# Patient Record
Sex: MALE | Race: BLACK OR AFRICAN AMERICAN | Employment: OTHER | URBAN - NONMETROPOLITAN AREA
[De-identification: names, ages, dates, MRNs, and addresses within clinical notes are randomized per-mention and may not be internally consistent; named-entity substitution may affect disease eponyms.]

---

## 2018-12-01 ENCOUNTER — APPOINTMENT (OUTPATIENT)
Dept: GENERAL RADIOLOGY | Age: 48
End: 2018-12-01
Payer: MEDICAID

## 2018-12-01 ENCOUNTER — HOSPITAL ENCOUNTER (EMERGENCY)
Age: 48
Discharge: HOME | End: 2018-12-01
Attending: EMERGENCY MEDICINE
Payer: MEDICAID

## 2018-12-01 VITALS
SYSTOLIC BLOOD PRESSURE: 141 MMHG | RESPIRATION RATE: 16 BRPM | HEIGHT: 75 IN | DIASTOLIC BLOOD PRESSURE: 84 MMHG | BODY MASS INDEX: 39.17 KG/M2 | OXYGEN SATURATION: 95 % | HEART RATE: 72 BPM | WEIGHT: 315 LBS | TEMPERATURE: 97.6 F

## 2018-12-01 DIAGNOSIS — R07.9 ACUTE CHEST PAIN: Primary | ICD-10-CM

## 2018-12-01 LAB
ALBUMIN SERPL-MCNC: 3.9 G/DL (ref 3.5–5.2)
ALP BLD-CCNC: 51 U/L (ref 40–130)
ALT SERPL-CCNC: 41 U/L (ref 5–41)
ANION GAP SERPL CALCULATED.3IONS-SCNC: 14 MMOL/L (ref 7–19)
APTT: 26.4 SEC (ref 26–36.2)
AST SERPL-CCNC: 30 U/L (ref 5–40)
BASOPHILS ABSOLUTE: 0 K/UL (ref 0–0.2)
BASOPHILS RELATIVE PERCENT: 0.5 % (ref 0–1)
BILIRUB SERPL-MCNC: 0.6 MG/DL (ref 0.2–1.2)
BUN BLDV-MCNC: 12 MG/DL (ref 6–20)
CALCIUM SERPL-MCNC: 8.4 MG/DL (ref 8.6–10)
CHLORIDE BLD-SCNC: 101 MMOL/L (ref 98–111)
CO2: 22 MMOL/L (ref 22–29)
CREAT SERPL-MCNC: 1 MG/DL (ref 0.5–1.2)
EOSINOPHILS ABSOLUTE: 0.1 K/UL (ref 0–0.6)
EOSINOPHILS RELATIVE PERCENT: 3.4 % (ref 0–5)
GFR NON-AFRICAN AMERICAN: >60
GLUCOSE BLD-MCNC: 104 MG/DL (ref 74–109)
HCT VFR BLD CALC: 39.2 % (ref 42–52)
HEMOGLOBIN: 12.2 G/DL (ref 14–18)
INR BLD: 1 (ref 0.88–1.18)
LIPASE: 20 U/L (ref 13–60)
LYMPHOCYTES ABSOLUTE: 0.7 K/UL (ref 1.1–4.5)
LYMPHOCYTES RELATIVE PERCENT: 17.8 % (ref 20–40)
MCH RBC QN AUTO: 27.8 PG (ref 27–31)
MCHC RBC AUTO-ENTMCNC: 31.1 G/DL (ref 33–37)
MCV RBC AUTO: 89.3 FL (ref 80–94)
MONOCYTES ABSOLUTE: 0.5 K/UL (ref 0–0.9)
MONOCYTES RELATIVE PERCENT: 11.1 % (ref 0–10)
NEUTROPHILS ABSOLUTE: 2.7 K/UL (ref 1.5–7.5)
NEUTROPHILS RELATIVE PERCENT: 65.3 % (ref 50–65)
PDW BLD-RTO: 12.8 % (ref 11.5–14.5)
PERFORMED ON: NORMAL
PLATELET # BLD: 187 K/UL (ref 130–400)
PMV BLD AUTO: 11.1 FL (ref 9.4–12.4)
POC TROPONIN I: 0.04 NG/ML (ref 0–0.08)
POTASSIUM SERPL-SCNC: 3.4 MMOL/L (ref 3.5–5)
PROTHROMBIN TIME: 13.1 SEC (ref 12–14.6)
RBC # BLD: 4.39 M/UL (ref 4.7–6.1)
SODIUM BLD-SCNC: 137 MMOL/L (ref 136–145)
TOTAL PROTEIN: 7.3 G/DL (ref 6.6–8.7)
TROPONIN: <0.01 NG/ML (ref 0–0.03)
WBC # BLD: 4.2 K/UL (ref 4.8–10.8)

## 2018-12-01 PROCEDURE — 6370000000 HC RX 637 (ALT 250 FOR IP): Performed by: EMERGENCY MEDICINE

## 2018-12-01 PROCEDURE — 93005 ELECTROCARDIOGRAM TRACING: CPT

## 2018-12-01 PROCEDURE — 85730 THROMBOPLASTIN TIME PARTIAL: CPT

## 2018-12-01 PROCEDURE — 84484 ASSAY OF TROPONIN QUANT: CPT

## 2018-12-01 PROCEDURE — 96375 TX/PRO/DX INJ NEW DRUG ADDON: CPT

## 2018-12-01 PROCEDURE — 99285 EMERGENCY DEPT VISIT HI MDM: CPT | Performed by: EMERGENCY MEDICINE

## 2018-12-01 PROCEDURE — 2580000003 HC RX 258: Performed by: EMERGENCY MEDICINE

## 2018-12-01 PROCEDURE — 99285 EMERGENCY DEPT VISIT HI MDM: CPT

## 2018-12-01 PROCEDURE — 85025 COMPLETE CBC W/AUTO DIFF WBC: CPT

## 2018-12-01 PROCEDURE — 71045 X-RAY EXAM CHEST 1 VIEW: CPT

## 2018-12-01 PROCEDURE — 85610 PROTHROMBIN TIME: CPT

## 2018-12-01 PROCEDURE — 96374 THER/PROPH/DIAG INJ IV PUSH: CPT

## 2018-12-01 PROCEDURE — 36415 COLL VENOUS BLD VENIPUNCTURE: CPT

## 2018-12-01 PROCEDURE — 83690 ASSAY OF LIPASE: CPT

## 2018-12-01 PROCEDURE — 6360000002 HC RX W HCPCS: Performed by: EMERGENCY MEDICINE

## 2018-12-01 PROCEDURE — 80053 COMPREHEN METABOLIC PANEL: CPT

## 2018-12-01 RX ORDER — PANTOPRAZOLE SODIUM 40 MG/1
40 TABLET, DELAYED RELEASE ORAL DAILY
Qty: 30 TABLET | Refills: 1 | Status: SHIPPED | OUTPATIENT
Start: 2018-12-01

## 2018-12-01 RX ORDER — MELOXICAM 15 MG/1
15 TABLET ORAL DAILY
COMMUNITY

## 2018-12-01 RX ORDER — ONDANSETRON 2 MG/ML
4 INJECTION INTRAMUSCULAR; INTRAVENOUS ONCE
Status: COMPLETED | OUTPATIENT
Start: 2018-12-01 | End: 2018-12-01

## 2018-12-01 RX ORDER — MORPHINE SULFATE 2 MG/ML
4 INJECTION, SOLUTION INTRAMUSCULAR; INTRAVENOUS ONCE
Status: COMPLETED | OUTPATIENT
Start: 2018-12-01 | End: 2018-12-01

## 2018-12-01 RX ORDER — 0.9 % SODIUM CHLORIDE 0.9 %
1000 INTRAVENOUS SOLUTION INTRAVENOUS ONCE
Status: COMPLETED | OUTPATIENT
Start: 2018-12-01 | End: 2018-12-01

## 2018-12-01 RX ADMIN — ONDANSETRON 4 MG: 2 INJECTION INTRAMUSCULAR; INTRAVENOUS at 13:50

## 2018-12-01 RX ADMIN — SODIUM CHLORIDE 1000 ML: 9 INJECTION, SOLUTION INTRAVENOUS at 13:54

## 2018-12-01 RX ADMIN — MORPHINE SULFATE 4 MG: 2 INJECTION, SOLUTION INTRAMUSCULAR; INTRAVENOUS at 13:52

## 2018-12-01 RX ADMIN — LIDOCAINE HYDROCHLORIDE: 20 SOLUTION ORAL; TOPICAL at 13:54

## 2018-12-01 ASSESSMENT — PAIN SCALES - GENERAL
PAINLEVEL_OUTOF10: 6
PAINLEVEL_OUTOF10: 8
PAINLEVEL_OUTOF10: 0

## 2018-12-01 ASSESSMENT — PAIN DESCRIPTION - LOCATION: LOCATION: CHEST

## 2018-12-01 ASSESSMENT — ENCOUNTER SYMPTOMS
RHINORRHEA: 0
SORE THROAT: 0
ABDOMINAL PAIN: 1
DIARRHEA: 1
VOMITING: 1
SHORTNESS OF BREATH: 0
BACK PAIN: 0
COUGH: 0
NAUSEA: 1

## 2018-12-01 ASSESSMENT — PAIN DESCRIPTION - PAIN TYPE: TYPE: ACUTE PAIN

## 2018-12-01 ASSESSMENT — PAIN DESCRIPTION - FREQUENCY: FREQUENCY: INTERMITTENT

## 2018-12-01 NOTE — ED PROVIDER NOTES
[facility] EMERGENCY DEPT  eMERGENCY dEPARTMENT eNCOUnter      Pt Name: Everett Jiang  MRN: [id number]  [place] 1970  Date of evaluation: 12/1/2018  Provider: Melissa Starr MD    [address] [place] [address]       Chief Complaint   Patient presents with    Chest Pain     started yesterday, radiating to back, no cardiac hx, pt also complains of n/v/d         HISTORY OF PRESENT ILLNESS   (Location/Symptom, Timing/Onset,Context/Setting, Quality, Duration, Modifying Factors, Severity)  Note limiting factors. Everett Jiang is a 50 y.o. male who presents to the emergency department For concern of chest discomfort. Patient states since yesterday he has had vague lower chest discomfort that is worse when he lays down. He states prior to arrival was seen like it is radiating to his back somewhat. He has had 1 episode of vomiting yesterday and several episodes of watery diarrhea. His girlfriend take his temperature yesterday and told it was over 100 but he is not sure of the exact measurement. He denies any reflux type symptoms or burning type sensation. No prior cardiac history does admit to history of hypertension but no other obvious cardiac risk factors. He drinks 4 beers per day but has no prior history of pancreatitis. He has no complaint of shortness of breath or leg swelling. HPI    NursingNotes were reviewed. REVIEW OF SYSTEMS    (2-9 systems for level 4, 10 or more for level 5)     Review of Systems   Constitutional: Positive for fatigue and fever. Negative for chills. HENT: Negative for rhinorrhea and sore throat. Respiratory: Negative for cough and shortness of breath. Cardiovascular: Positive for chest pain. Negative for leg swelling. Gastrointestinal: Positive for abdominal pain, diarrhea, nausea and vomiting. Genitourinary: Negative for dysuria and frequency. Musculoskeletal: Negative for back pain and neck pain. Neurological: Negative for dizziness and headaches.    All other systems reviewed and

## 2018-12-04 LAB
EKG P AXIS: 44 DEGREES
EKG P-R INTERVAL: 164 MS
EKG Q-T INTERVAL: 354 MS
EKG QRS DURATION: 78 MS
EKG QTC CALCULATION (BAZETT): 387 MS
EKG T AXIS: 40 DEGREES

## 2019-02-10 ENCOUNTER — APPOINTMENT (OUTPATIENT)
Dept: GENERAL RADIOLOGY | Age: 49
End: 2019-02-10
Payer: MEDICAID

## 2019-02-10 ENCOUNTER — HOSPITAL ENCOUNTER (EMERGENCY)
Age: 49
Discharge: HOME | End: 2019-02-10
Payer: MEDICAID

## 2019-02-10 VITALS
RESPIRATION RATE: 16 BRPM | SYSTOLIC BLOOD PRESSURE: 139 MMHG | OXYGEN SATURATION: 96 % | HEIGHT: 74 IN | WEIGHT: 315 LBS | DIASTOLIC BLOOD PRESSURE: 77 MMHG | BODY MASS INDEX: 40.43 KG/M2 | HEART RATE: 81 BPM | TEMPERATURE: 97.6 F

## 2019-02-10 DIAGNOSIS — M79.675 GREAT TOE PAIN, LEFT: Primary | ICD-10-CM

## 2019-02-10 PROCEDURE — 73630 X-RAY EXAM OF FOOT: CPT

## 2019-02-10 PROCEDURE — 96372 THER/PROPH/DIAG INJ SC/IM: CPT

## 2019-02-10 PROCEDURE — 99283 EMERGENCY DEPT VISIT LOW MDM: CPT | Performed by: NURSE PRACTITIONER

## 2019-02-10 PROCEDURE — 6360000002 HC RX W HCPCS: Performed by: NURSE PRACTITIONER

## 2019-02-10 PROCEDURE — 99283 EMERGENCY DEPT VISIT LOW MDM: CPT

## 2019-02-10 PROCEDURE — 6370000000 HC RX 637 (ALT 250 FOR IP): Performed by: NURSE PRACTITIONER

## 2019-02-10 RX ORDER — METHYLPREDNISOLONE 4 MG/1
TABLET ORAL
Qty: 1 KIT | Refills: 0 | Status: SHIPPED | OUTPATIENT
Start: 2019-02-10 | End: 2020-03-08

## 2019-02-10 RX ORDER — DEXAMETHASONE SODIUM PHOSPHATE 10 MG/ML
10 INJECTION, SOLUTION INTRAMUSCULAR; INTRAVENOUS ONCE
Status: COMPLETED | OUTPATIENT
Start: 2019-02-10 | End: 2019-02-10

## 2019-02-10 RX ORDER — HYDROCODONE BITARTRATE AND ACETAMINOPHEN 5; 325 MG/1; MG/1
1 TABLET ORAL EVERY 6 HOURS PRN
Qty: 12 TABLET | Refills: 0 | Status: SHIPPED | OUTPATIENT
Start: 2019-02-10 | End: 2019-02-13

## 2019-02-10 RX ORDER — KETOROLAC TROMETHAMINE 30 MG/ML
60 INJECTION, SOLUTION INTRAMUSCULAR; INTRAVENOUS ONCE
Status: COMPLETED | OUTPATIENT
Start: 2019-02-10 | End: 2019-02-10

## 2019-02-10 RX ORDER — OXYCODONE HYDROCHLORIDE AND ACETAMINOPHEN 5; 325 MG/1; MG/1
2 TABLET ORAL ONCE
Status: COMPLETED | OUTPATIENT
Start: 2019-02-10 | End: 2019-02-10

## 2019-02-10 RX ORDER — NAPROXEN 500 MG/1
500 TABLET ORAL 2 TIMES DAILY
Qty: 20 TABLET | Refills: 0 | Status: SHIPPED | OUTPATIENT
Start: 2019-02-10 | End: 2020-06-17

## 2019-02-10 RX ADMIN — DEXAMETHASONE SODIUM PHOSPHATE 10 MG: 10 INJECTION, SOLUTION INTRAMUSCULAR; INTRAVENOUS at 02:41

## 2019-02-10 RX ADMIN — KETOROLAC TROMETHAMINE 60 MG: 30 INJECTION, SOLUTION INTRAMUSCULAR at 02:40

## 2019-02-10 RX ADMIN — OXYCODONE AND ACETAMINOPHEN 2 TABLET: 5; 325 TABLET ORAL at 02:39

## 2019-02-10 ASSESSMENT — PAIN DESCRIPTION - LOCATION: LOCATION: FOOT

## 2019-02-10 ASSESSMENT — PAIN DESCRIPTION - PAIN TYPE: TYPE: ACUTE PAIN

## 2019-02-10 ASSESSMENT — PAIN DESCRIPTION - ORIENTATION: ORIENTATION: LEFT

## 2019-02-10 ASSESSMENT — PAIN SCALES - GENERAL
PAINLEVEL_OUTOF10: 10
PAINLEVEL_OUTOF10: 0

## 2019-04-19 ENCOUNTER — APPOINTMENT (OUTPATIENT)
Dept: CT IMAGING | Age: 49
End: 2019-04-19
Payer: OTHER MISCELLANEOUS

## 2019-04-19 ENCOUNTER — HOSPITAL ENCOUNTER (EMERGENCY)
Age: 49
Discharge: HOME | End: 2019-04-19
Payer: OTHER MISCELLANEOUS

## 2019-04-19 VITALS
SYSTOLIC BLOOD PRESSURE: 137 MMHG | WEIGHT: 315 LBS | OXYGEN SATURATION: 97 % | HEART RATE: 74 BPM | DIASTOLIC BLOOD PRESSURE: 83 MMHG | BODY MASS INDEX: 42.37 KG/M2 | TEMPERATURE: 98.9 F | RESPIRATION RATE: 14 BRPM
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
## 2019-04-19 PROCEDURE — 96372 THER/PROPH/DIAG INJ SC/IM: CPT

## 2019-04-19 PROCEDURE — 99284 EMERGENCY DEPT VISIT MOD MDM: CPT | Performed by: PHYSICIAN ASSISTANT

## 2019-04-19 PROCEDURE — 6360000002 HC RX W HCPCS: Performed by: PHYSICIAN ASSISTANT

## 2019-04-19 PROCEDURE — 99284 EMERGENCY DEPT VISIT MOD MDM: CPT

## 2019-04-19 PROCEDURE — 72125 CT NECK SPINE W/O DYE: CPT

## 2019-04-19 RX ORDER — CYCLOBENZAPRINE HCL 10 MG
10 TABLET ORAL 3 TIMES DAILY PRN
Qty: 30 TABLET | Refills: 0 | Status: SHIPPED | OUTPATIENT
Start: 2019-04-19 | End: 2019-04-29

## 2019-04-19 RX ORDER — ORPHENADRINE CITRATE 30 MG/ML
60 INJECTION INTRAMUSCULAR; INTRAVENOUS ONCE
Status: COMPLETED | OUTPATIENT
Start: 2019-04-19 | End: 2019-04-19

## 2019-04-19 RX ADMIN — ORPHENADRINE CITRATE 60 MG: 30 INJECTION INTRAMUSCULAR; INTRAVENOUS at 17:06

## 2019-04-19 ASSESSMENT — PAIN SCALES - GENERAL
PAINLEVEL_OUTOF10: 2
PAINLEVEL_OUTOF10: 8

## 2019-04-19 ASSESSMENT — PAIN DESCRIPTION - PAIN TYPE: TYPE: ACUTE PAIN

## 2019-04-19 ASSESSMENT — PAIN DESCRIPTION - LOCATION
LOCATION: NECK
LOCATION: NECK

## 2019-04-19 ASSESSMENT — ENCOUNTER SYMPTOMS
SHORTNESS OF BREATH: 0
BACK PAIN: 0
EYE ITCHING: 0
PHOTOPHOBIA: 0
EYE DISCHARGE: 0
COLOR CHANGE: 0
COUGH: 0

## 2019-04-19 ASSESSMENT — PAIN - FUNCTIONAL ASSESSMENT: PAIN_FUNCTIONAL_ASSESSMENT: 0-10
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
## 2019-04-22 ASSESSMENT — ENCOUNTER SYMPTOMS
CHOKING: 0
WHEEZING: 0
STRIDOR: 0

## 2019-05-09 ENCOUNTER — TRANSCRIBE ORDERS (OUTPATIENT)
Dept: PHYSICAL THERAPY | Facility: HOSPITAL | Age: 49
End: 2019-05-09

## 2019-05-09 DIAGNOSIS — M54.2 CERVICALGIA: Primary | ICD-10-CM

## 2019-05-17 ENCOUNTER — HOSPITAL ENCOUNTER (OUTPATIENT)
Dept: PHYSICAL THERAPY | Facility: HOSPITAL | Age: 49
Setting detail: THERAPIES SERIES
Discharge: HOME | End: 2019-05-17

## 2019-05-17 DIAGNOSIS — M54.2 CERVICALGIA: ICD-10-CM

## 2019-05-17 DIAGNOSIS — S16.1XXA CERVICAL STRAIN, ACUTE, INITIAL ENCOUNTER: Primary | ICD-10-CM

## 2019-05-17 PROCEDURE — 97161 PT EVAL LOW COMPLEX 20 MIN: CPT | Performed by: PHYSICAL THERAPIST
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
## 2019-05-20 ENCOUNTER — HOSPITAL ENCOUNTER (OUTPATIENT)
Dept: PHYSICAL THERAPY | Facility: HOSPITAL | Age: 49
Setting detail: THERAPIES SERIES
Discharge: HOME | End: 2019-05-20

## 2019-05-20 DIAGNOSIS — M54.2 CERVICALGIA: ICD-10-CM

## 2019-05-20 DIAGNOSIS — S16.1XXA CERVICAL STRAIN, ACUTE, INITIAL ENCOUNTER: Primary | ICD-10-CM

## 2019-05-20 PROCEDURE — 97032 APPL MODALITY 1+ESTIM EA 15: CPT

## 2019-05-20 PROCEDURE — 97140 MANUAL THERAPY 1/> REGIONS: CPT

## 2019-05-20 NOTE — THERAPY TREATMENT NOTE
"    Outpatient Physical Therapy Ortho Treatment Note  [facility]     Patient Name: Lee Deal  : 1970  MRN: [id number]  Today's Date: 2019      Visit Date: 2019    Visit Dx:    ICD-10-CM ICD-9-CM   1. Cervical strain, acute, initial encounter S16.1XXA 847.0   2. Cervicalgia M54.2 723.1       There is no problem list on file for this patient.       Past Medical History:   Diagnosis Date   • Anxiety    • Depression    • Hypertension         Past Surgical History:   Procedure Laterality Date   • JOINT REPLACEMENT Left     TKR (post)                       PT Assessment/Plan     Row Name 19 1201          PT Assessment    Assessment Comments  No goals have been met at this time; however, today is the first treaatmment session following the initial evaluation. He does present with a TP in his L upper trap that is very point tender and limited the pressure I could apply with STM today.   -EC        PT Plan    PT Plan Comments  Continue a conservative progressioon for now. Consider utilizing KT applications to reduce soft tissue restrictions and inhibit his L upper trap.  -EC       User Key  (r) = Recorded By, (t) = Taken By, (c) = Cosigned By    Initials Name Provider Type    Ron Jennings PTA Physical Therapy Assistant          Modalities     Row Name 19 1100             Subjective Pain    Post-Treatment Pain Level  3  -EC         ELECTRICAL STIMULATION    Attended/Unattended  Attended  -EC      Location/Electrode Placement/Other  B lower cevical region and upper traps  -EC      18148 - PT E-Stim Attended (Manual) Minutes  15  -EC        User Key  (r) = Recorded By, (t) = Taken By, (c) = Cosigned By    Initials Name Provider Type    Ron Jennings PTA Physical Therapy Assistant        Exercises     Row Name 19 1100             Subjective Comments    Subjective Comments  Pt reports pain in his neck taht \"runs into the middle\"  -EC         Subjective Pain    Able to " rate subjective pain?  yes  -EC      Pre-Treatment Pain Level  6  -EC      Post-Treatment Pain Level  3  -EC         Total Minutes    85324 - PT Manual Therapy Minutes  30  -EC        User Key  (r) = Recorded By, (t) = Taken By, (c) = Cosigned By    Initials Name Provider Type    EC Ron Hurt PTA Physical Therapy Assistant                      Manual Rx (last 36 hours)      Manual Treatments     Row Name 05/20/19 1100             Total Minutes    21023 - PT Manual Therapy Minutes  30  -EC         Manual Rx 1    Manual Rx 1 Location  B upper trap, levator scapulae, and lower cervical paraspinals  -EC      Manual Rx 1 Type  STM in sitting with B UE unweighted  -EC      Manual Rx 1 Duration  30  -EC        User Key  (r) = Recorded By, (t) = Taken By, (c) = Cosigned By    Initials Name Provider Type    Ron Jennings PTA Physical Therapy Assistant          PT OP Goals     Row Name 05/20/19 1105 05/20/19 1100       PT Short Term Goals    STG Date to Achieve  --  06/07/19  -EC    STG 1  --  Improve muscle guarding in UT and scalenes  -EC    STG 1 Progress  --  Ongoing  -EC    STG 2  --  Improve mobility of upper thoracic spine  -EC    STG 2 Progress  --  Ongoing  -EC       Long Term Goals    LTG Date to Achieve  --  06/28/19  -EC    LTG 1  --  Improve good cervical rotation to symmetrical without pain  -EC    LTG 1 Progress  --  Ongoing  -EC    LTG 2  --  Improve cervical extension to 75% without pain  -EC    LTG 2 Progress  --  Ongoing  -EC    LTG 3  --  Able to perform household activities without exacerbation of pain  -EC    LTG 3 Progress  --  Ongoing  -EC    LTG 4  --  Independent with HEP for ROM   -EC    LTG 4 Progress  --  Ongoing  -EC    LTG 5  --  Improve NDI to <10%  -EC    LTG 5 Progress  --  Ongoing  -EC       Time Calculation    PT Goal Re-Cert Due Date  06/16/19  -EC  --      User Key  (r) = Recorded By, (t) = Taken By, (c) = Cosigned By    Initials Name Provider Type    Ron Jennings,  PTA Physical Therapy Assistant          Therapy Education  Given: Symptoms/condition management, Pain management, Posture/body mechanics  How Provided: Verbal, Demonstration  Level of Understanding: Verbalized, Demonstrated              Time Calculation:   Start Time: 1105  Stop Time: 1150  Time Calculation (min): 45 min  Total Timed Code Minutes- PT: 45 minute(s)  Therapy Charges for Today     Code Description Service Date Service Provider Modifiers Qty    16155590936 HC PT MANUAL THERAPY EA 15 MIN 5/20/2019 Ron Hurt, PTA GP 2    21448296640 HC PT ELEC STIM EA-PER 15 MIN 5/20/2019 Ron Hurt, PTA GP 1                    Ron Hurt PTA  5/20/2019

## 2019-05-23 ENCOUNTER — HOSPITAL ENCOUNTER (OUTPATIENT)
Dept: PHYSICAL THERAPY | Facility: HOSPITAL | Age: 49
Setting detail: THERAPIES SERIES
Discharge: HOME | End: 2019-05-23

## 2019-05-23 DIAGNOSIS — M54.2 CERVICALGIA: ICD-10-CM

## 2019-05-23 DIAGNOSIS — S16.1XXA CERVICAL STRAIN, ACUTE, INITIAL ENCOUNTER: Primary | ICD-10-CM

## 2019-05-23 PROCEDURE — G0283 ELEC STIM OTHER THAN WOUND: HCPCS

## 2019-05-23 PROCEDURE — 29200 STRAPPING THORAX: CPT

## 2019-05-23 NOTE — THERAPY TREATMENT NOTE
"    Outpatient Physical Therapy Ortho Treatment Note  [facility]     Patient Name: Lee Deal  : 1970  MRN: [id number]  Today's Date: 2019      Visit Date: 2019    Visit Dx:    ICD-10-CM ICD-9-CM   1. Cervical strain, acute, initial encounter S16.1XXA 847.0   2. Cervicalgia M54.2 723.1       There is no problem list on file for this patient.       Past Medical History:   Diagnosis Date   • Anxiety    • Depression    • Hypertension         Past Surgical History:   Procedure Laterality Date   • JOINT REPLACEMENT Left     TKR (post)                       PT Assessment/Plan     Row Name 19 1200          PT Assessment    Assessment Comments  His pain symptoms are improved but we will need to continue a conservative progression. He still has a tendency to \"look for\" his pain symptoms by moving his neck with a combination of rotation and lateral bending despite cues to resist this impulse.  -EC        PT Plan    PT Plan Comments  Follow up his long term response to today's session and progress accordingly. Consider having him perform scapular retractions and monitor very closely to make sure he doesn't activate his B upper traps.  -EC       User Key  (r) = Recorded By, (t) = Taken By, (c) = Cosigned By    Initials Name Provider Type    Ron Jennings PTA Physical Therapy Assistant          Modalities     Row Name 19 1100             Subjective Pain    Post-Treatment Pain Level  3  -EC         ELECTRICAL STIMULATION    Attended/Unattended  Unattended  -EC      Stimulation Type  IFC  -EC      Max mAmp  28  -EC      Location/Electrode Placement/Other  B upper trap in sitting  -EC      19049 - PT E-Stim Attended (Manual) Minutes  30  -EC        User Key  (r) = Recorded By, (t) = Taken By, (c) = Cosigned By    Initials Name Provider Type    Ron Jennings PTA Physical Therapy Assistant        Exercises     Row Name 19 1100             Subjective Comments    Subjective " "Comments  Pt reports he is better since last session, reports neck pain more R than L  -EC         Subjective Pain    Able to rate subjective pain?  yes  -EC      Pre-Treatment Pain Level  4  -EC      Post-Treatment Pain Level  3  -EC         Total Minutes    35237 - PT Therapeutic Exercise Minutes  10  -EC         Exercise 1    Exercise Name 1  Sure prep and KT \"I\" strips B upper traps with inhabition technique  -EC        User Key  (r) = Recorded By, (t) = Taken By, (c) = Cosigned By    Initials Name Provider Type    Ron Jennings PTA Physical Therapy Assistant                       PT OP Goals     Row Name 05/23/19 1107          Time Calculation    PT Goal Re-Cert Due Date  06/16/19  -EC       User Key  (r) = Recorded By, (t) = Taken By, (c) = Cosigned By    Initials Name Provider Type    Ron Jennings PTA Physical Therapy Assistant          Therapy Education  Education Details: KT tape use, wear, and removal  Given: Symptoms/condition management, Pain management, Posture/body mechanics, Other (comment)  How Provided: Verbal  Provided to: Patient  Level of Understanding: Verbalized              Time Calculation:   Start Time: 1105  Stop Time: 1145  Time Calculation (min): 40 min  Total Timed Code Minutes- PT: 40 minute(s)  Therapy Charges for Today     Code Description Service Date Service Provider Modifiers Qty    31996921033 HC PT TAPING/STRAPPING-THORAX 5/23/2019 Ron Hurt PTA  1    35011725781 HC PT ELECTRICAL STIM UNATTENDED 5/23/2019 Ron Hurt PTA  2                    Ron Hurt PTA  5/23/2019     "

## 2019-05-28 ENCOUNTER — HOSPITAL ENCOUNTER (OUTPATIENT)
Dept: PHYSICAL THERAPY | Facility: HOSPITAL | Age: 49
Setting detail: THERAPIES SERIES
Discharge: HOME | End: 2019-05-28

## 2019-05-28 DIAGNOSIS — S16.1XXA CERVICAL STRAIN, ACUTE, INITIAL ENCOUNTER: Primary | ICD-10-CM

## 2019-05-28 PROCEDURE — 97140 MANUAL THERAPY 1/> REGIONS: CPT

## 2019-05-28 PROCEDURE — 97110 THERAPEUTIC EXERCISES: CPT

## 2019-05-28 NOTE — THERAPY TREATMENT NOTE
"    Outpatient Physical Therapy Ortho Treatment Note  [facility]     Patient Name: Lee Deal  : 1970  MRN: [id number]  Today's Date: 2019      Visit Date: 2019    Visit Dx:    ICD-10-CM ICD-9-CM   1. Cervical strain, acute, initial encounter S16.1XXA 847.0       There is no problem list on file for this patient.       Past Medical History:   Diagnosis Date   • Anxiety    • Depression    • Hypertension         Past Surgical History:   Procedure Laterality Date   • JOINT REPLACEMENT Left     TKR (post)                       PT Assessment/Plan     Row Name 19 1106          PT Assessment    Assessment Comments  Today we focused on decreasing cervical muscular guarding, light scapular strengthening, and increasing thoracic mobility. He was a bit tender to palpation moreso on his R side. Pt. required tactile cues to isolate scapular muscles and not engage B UT during scap squeezes.   -AF        PT Plan    PT Plan Comments  Continue to decrease soft tissue restrictions in cervical region and increase cervical and thoraic mobility. Bolster HEP next visit.   -AF       User Key  (r) = Recorded By, (t) = Taken By, (c) = Cosigned By    Initials Name Provider Type    AF Mariann Adam, PTA Physical Therapy Assistant            Exercises     Row Name 19 1105             Subjective Comments    Subjective Comments  Pt. reports taking it easy and relaxing over the long weekend. He has noticed an improvement in his cervical ROM and pain levels.   -AF         Subjective Pain    Able to rate subjective pain?  yes  -AF      Pre-Treatment Pain Level  4  -AF      Post-Treatment Pain Level  -- Pt. states \"it's a little lower than before\"  -AF      Subjective Pain Comment  neck  -AF         Total Minutes    52324 - PT Therapeutic Exercise Minutes  21  -AF      02563 - PT Manual Therapy Minutes  24  -AF         Exercise 1    Exercise Name 1  scap squeezes  -AF      Cueing 1  Verbal;Tactile;Demo  -AF  "     Sets 1  2  -AF      Reps 1  10  -AF         Exercise 2    Exercise Name 2  chin tucks  -AF      Cueing 2  Verbal;Demo  -AF      Sets 2  2  -AF      Reps 2  10  -AF      Time 2  2 sec hold  -AF         Exercise 3    Exercise Name 3  thoracic towel roll stretch   -AF      Cueing 3  Verbal  -AF      Sets 3  1  -AF      Time 3  5 min  -AF        User Key  (r) = Recorded By, (t) = Taken By, (c) = Cosigned By    Initials Name Provider Type    AF [place], Mariann, PTA Physical Therapy Assistant                      Manual Rx (last 36 hours)      Manual Treatments     Row Name 05/28/19 1106             Total Minutes    09035 - PT Manual Therapy Minutes  24  -AF         Manual Rx 1    Manual Rx 1 Location  B UT, LS, and lower cervical paraspinals  -AF      Manual Rx 1 Type  STM seated in chair  -AF      Manual Rx 1 Duration  20  -AF         Manual Rx 2    Manual Rx 2 Location  mid-thoracic  -AF      Manual Rx 2 Type  extension mobilizations seated in chair B UE supported on pillow and raised table  -AF      Manual Rx 2 Duration  4  -AF        User Key  (r) = Recorded By, (t) = Taken By, (c) = Cosigned By    Initials Name Provider Type    AF [place], Mariann, PTA Physical Therapy Assistant          PT OP Goals     Row Name 05/28/19 1106          PT Short Term Goals    STG Date to Achieve  06/07/19  -AF     STG 1  Improve muscle guarding in UT and scalenes  -AF     STG 1 Progress  Ongoing  -AF     STG 2  Improve mobility of upper thoracic spine  -AF     STG 2 Progress  Ongoing  -AF     STG 2 Progress Comments  Pt. was able to tolerate 4 min of thoracic extension mobilizations w/out c/o of pain.   -AF        Long Term Goals    LTG Date to Achieve  06/28/19  -AF     LTG 1  Improve good cervical rotation to symmetrical without pain  -AF     LTG 1 Progress  Ongoing  -AF     LTG 2  Improve cervical extension to 75% without pain  -AF     LTG 2 Progress  Ongoing  -AF     LTG 3  Able to perform household activities without  exacerbation of pain  -AF     LTG 3 Progress  Ongoing  -AF     LTG 4  Independent with HEP for ROM   -AF     LTG 4 Progress  Ongoing  -AF     LTG 5  Improve NDI to <10%  -AF     LTG 5 Progress  Ongoing  -AF        Time Calculation    PT Goal Re-Cert Due Date  06/16/19  -AF       User Key  (r) = Recorded By, (t) = Taken By, (c) = Cosigned By    Initials Name Provider Type    AF [place], Mariann, PTA Physical Therapy Assistant          Therapy Education  Given: Posture/body mechanics  Program: New  How Provided: Verbal, Demonstration  Provided to: Patient  Level of Understanding: Verbalized              Time Calculation:   Start Time: 1106  Stop Time: 1156  Time Calculation (min): 50 min  Total Timed Code Minutes- PT: 45 minute(s)  Therapy Charges for Today     Code Description Service Date Service Provider Modifiers Qty    64170802842 HC PT THER PROC EA 15 MIN 5/28/2019 Wallace, Mariann, PTA GP 1    48484272326 HC PT MANUAL THERAPY EA 15 MIN 5/28/2019 Wallace, Mariann, PTA GP 2                    Mariannraina Adam, PTA  5/28/2019

## 2019-05-30 ENCOUNTER — HOSPITAL ENCOUNTER (OUTPATIENT)
Dept: PHYSICAL THERAPY | Facility: HOSPITAL | Age: 49
Setting detail: THERAPIES SERIES
Discharge: HOME | End: 2019-05-30

## 2019-05-30 DIAGNOSIS — S16.1XXA CERVICAL STRAIN, ACUTE, INITIAL ENCOUNTER: Primary | ICD-10-CM

## 2019-05-30 DIAGNOSIS — M54.2 CERVICALGIA: ICD-10-CM

## 2019-05-30 PROCEDURE — 97140 MANUAL THERAPY 1/> REGIONS: CPT

## 2019-05-30 NOTE — THERAPY TREATMENT NOTE
Outpatient Physical Therapy Ortho Treatment Note  [facility]     Patient Name: Lee Deal  : 1970  MRN: [id number]  Today's Date: 2019      Visit Date: 2019    Visit Dx:    ICD-10-CM ICD-9-CM   1. Cervical strain, acute, initial encounter S16.1XXA 847.0   2. Cervicalgia M54.2 723.1       There is no problem list on file for this patient.       Past Medical History:   Diagnosis Date   • Anxiety    • Depression    • Hypertension         Past Surgical History:   Procedure Laterality Date   • JOINT REPLACEMENT Left     TKR (post)                       PT Assessment/Plan     Row Name 19 1236          PT Assessment    Assessment Comments  Today was the first day we attempted to perform some manual mobilizations and traction to the cervical region and he tolerated these interventions well. While he wasn't guarded persae he did exhibit some slight apprehension but not instances of positive levitation.   -EC        PT Plan    PT Plan Comments  Continue a conservative progression as the general nature of whiplash is due to multiple microtramas to the soft tissues along the spine.  -EC       User Key  (r) = Recorded By, (t) = Taken By, (c) = Cosigned By    Initials Name Provider Type    Ron Jennings PTA Physical Therapy Assistant            Exercises     Row Name 19 1100             Subjective Comments    Subjective Comments  Pt reports neck symptoms slowly improving.  -EC         Subjective Pain    Able to rate subjective pain?  yes  -EC      Pre-Treatment Pain Level  3  -EC      Post-Treatment Pain Level  1  -EC         Total Minutes    52245 - PT Manual Therapy Minutes  40  -EC        User Key  (r) = Recorded By, (t) = Taken By, (c) = Cosigned By    Initials Name Provider Type    Ron Jennings PTA Physical Therapy Assistant                      Manual Rx (last 36 hours)      Manual Treatments     Row Name 19 1100             Total Minutes    88611 - PT Manual  Therapy Minutes  40  -EC         Manual Rx 1    Manual Rx 1 Location  thoracic  -EC      Manual Rx 1 Type  STM with ratchet roller in prone  -EC      Manual Rx 1 Duration  12  -EC         Manual Rx 2    Manual Rx 2 Location  thoracic  -EC      Manual Rx 2 Type  prone extension mobilizations  -EC      Manual Rx 2 Grade  2 sustained  -EC      Manual Rx 2 Duration  12  -EC         Manual Rx 3    Manual Rx 3 Location  cervical  -EC      Manual Rx 3 Type  suboccipital releases, cervical traction and traction with B lateral bends   -EC      Manual Rx 3 Duration  16  -EC        User Key  (r) = Recorded By, (t) = Taken By, (c) = Cosigned By    Initials Name Provider Type    EC Ron Hurt PTA Physical Therapy Assistant          PT OP Goals     Row Name 05/30/19 1100          PT Short Term Goals    STG Date to Achieve  06/07/19  -EC     STG 1  Improve muscle guarding in UT and scalenes  -EC     STG 1 Progress  Ongoing  -EC     STG 2  Improve mobility of upper thoracic spine  -EC     STG 2 Progress  Ongoing  -EC     STG 2 Progress Comments  addresssed with prone extension mobilizations today.  -EC        Long Term Goals    LTG Date to Achieve  06/28/19  -EC     LTG 1  Improve good cervical rotation to symmetrical without pain  -EC     LTG 1 Progress  Ongoing  -EC     LTG 2  Improve cervical extension to 75% without pain  -EC     LTG 2 Progress  Ongoing  -EC     LTG 3  Able to perform household activities without exacerbation of pain  -EC     LTG 3 Progress  Ongoing  -EC     LTG 4  Independent with HEP for ROM   -EC     LTG 4 Progress  Ongoing  -EC     LTG 5  Improve NDI to <10%  -EC     LTG 5 Progress  Ongoing  -EC       User Key  (r) = Recorded By, (t) = Taken By, (c) = Cosigned By    Initials Name Provider Type    EC Ron Hurt PTA Physical Therapy Assistant          Therapy Education  Given: Symptoms/condition management, Pain management, Posture/body mechanics  How Provided: Verbal  Provided to:  Patient  Level of Understanding: Verbalized              Time Calculation:   Start Time: 1106  Stop Time: 1140  Time Calculation (min): 34 min  Therapy Charges for Today     Code Description Service Date Service Provider Modifiers Qty    94426619110 HC PT MANUAL THERAPY EA 15 MIN 5/30/2019 Ron Hurt, PTA GP 3                    Ron Hurt PTA  5/30/2019

## 2019-06-04 ENCOUNTER — HOSPITAL ENCOUNTER (OUTPATIENT)
Dept: PHYSICAL THERAPY | Facility: HOSPITAL | Age: 49
Setting detail: THERAPIES SERIES
Discharge: HOME | End: 2019-06-04

## 2019-06-04 DIAGNOSIS — S16.1XXA CERVICAL STRAIN, ACUTE, INITIAL ENCOUNTER: Primary | ICD-10-CM

## 2019-06-04 PROCEDURE — 97140 MANUAL THERAPY 1/> REGIONS: CPT

## 2019-06-04 NOTE — THERAPY TREATMENT NOTE
Outpatient Physical Therapy Ortho Treatment Note  [facility]     Patient Name: Lee Deal  : 1970  MRN: [id number]  Today's Date: 2019      Visit Date: 2019    Visit Dx:    ICD-10-CM ICD-9-CM   1. Cervical strain, acute, initial encounter S16.1XXA 847.0       There is no problem list on file for this patient.       Past Medical History:   Diagnosis Date   • Anxiety    • Depression    • Hypertension         Past Surgical History:   Procedure Laterality Date   • JOINT REPLACEMENT Left     TKR (post)                       PT Assessment/Plan     Row Name 19 1106          PT Assessment    Assessment Comments  Today we focused on reducing soft tissue restrictions in his cervical and thoracic region. He was tender to palpation during STM to L cervical area. Pt. reported pain that traveled to his L ear when pressure was applied to a trigger point on the L side of his neck.   -AF        PT Plan    PT Plan Comments  Continue to reduce soft tissue restrictions in the cervical region as well as increase ROM.   -AF       User Key  (r) = Recorded By, (t) = Taken By, (c) = Cosigned By    Initials Name Provider Type    AF [place], Mariann, PTA Physical Therapy Assistant            Exercises     Row Name 19 1104             Subjective Comments    Subjective Comments  Pt. reports noticing his neck pain decreasing and is now able to sleep through the night w/out pain.   -AF         Subjective Pain    Able to rate subjective pain?  yes  -AF      Pre-Treatment Pain Level  2  -AF      Post-Treatment Pain Level  3  -AF      Subjective Pain Comment  neck   -AF         Total Minutes    28311 - PT Manual Therapy Minutes  39  -AF        User Key  (r) = Recorded By, (t) = Taken By, (c) = Cosigned By    Initials Name Provider Type    AF [place], Mariann, PTA Physical Therapy Assistant                      Manual Rx (last 36 hours)      Manual Treatments     Row Name 19 1103             Total  Minutes    26000 - PT Manual Therapy Minutes  39  -AF         Manual Rx 1    Manual Rx 1 Location  thoracic  -AF      Manual Rx 1 Type  IASTM w/ dark blue foam roller  -AF      Manual Rx 1 Duration  6  -AF         Manual Rx 2    Manual Rx 2 Location  thoracic  -AF      Manual Rx 2 Type  extenstion mobilizations  -AF      Manual Rx 2 Duration  6  -AF         Manual Rx 3    Manual Rx 3 Location  B UT, LS, & suboccipitals  -AF      Manual Rx 3 Type  STM w/ massage cream, seated  -AF      Manual Rx 3 Duration  14  -AF         Manual Rx 4    Manual Rx 4 Location  cervical  -AF      Manual Rx 4 Type  suboccipital release  -AF      Manual Rx 4 Duration  4  -AF         Manual Rx 5    Manual Rx 5 Location  cervical  -AF      Manual Rx 5 Type  manual distraction w/ lateral stretches  -AF      Manual Rx 5 Duration  6 + 3 + 3 = 9  -AF        User Key  (r) = Recorded By, (t) = Taken By, (c) = Cosigned By    Initials Name Provider Type    AF [place], Mariann, PTA Physical Therapy Assistant          PT OP Goals     Row Name 06/04/19 1106          PT Short Term Goals    STG Date to Achieve  06/07/19  -AF     STG 1  Improve muscle guarding in UT and scalenes  -AF     STG 1 Progress  Ongoing  -AF     STG 1 Progress Comments  Pt. reported some tenderness during STM in his L cervical region.   -AF     STG 2  Improve mobility of upper thoracic spine  -AF     STG 2 Progress  Ongoing  -AF        Long Term Goals    LTG Date to Achieve  06/28/19  -AF     LTG 1  Improve good cervical rotation to symmetrical without pain  -AF     LTG 1 Progress  Ongoing  -AF     LTG 2  Improve cervical extension to 75% without pain  -AF     LTG 2 Progress  Ongoing  -AF     LTG 3  Able to perform household activities without exacerbation of pain  -AF     LTG 3 Progress  Ongoing  -AF     LTG 4  Independent with HEP for ROM   -AF     LTG 4 Progress  Ongoing  -AF     LTG 5  Improve NDI to <10%  -AF     LTG 5 Progress  Ongoing  -AF        Time Calculation    PT  Goal Re-Cert Due Date  06/16/19  -AF       User Key  (r) = Recorded By, (t) = Taken By, (c) = Cosigned By    Initials Name Provider Type    AF Mariann Adam PTA Physical Therapy Assistant          Therapy Education  Given: Posture/body mechanics, Pain management  Program: Reinforced  How Provided: Verbal  Provided to: Patient  Level of Understanding: Verbalized              Time Calculation:   Start Time: 1106  Stop Time: 1154  Time Calculation (min): 48 min  Total Timed Code Minutes- PT: 39 minute(s)  Therapy Charges for Today     Code Description Service Date Service Provider Modifiers Qty    15743566907 HC PT MANUAL THERAPY EA 15 MIN 6/4/2019 Mariann Adam PTA GP 3                    Mariann Adam PTA  6/4/2019

## 2019-06-06 ENCOUNTER — HOSPITAL ENCOUNTER (OUTPATIENT)
Dept: PHYSICAL THERAPY | Facility: HOSPITAL | Age: 49
Setting detail: THERAPIES SERIES
Discharge: HOME | End: 2019-06-06

## 2019-06-06 DIAGNOSIS — S16.1XXA CERVICAL STRAIN, ACUTE, INITIAL ENCOUNTER: Primary | ICD-10-CM

## 2019-06-06 PROCEDURE — 97140 MANUAL THERAPY 1/> REGIONS: CPT

## 2019-06-06 PROCEDURE — 97110 THERAPEUTIC EXERCISES: CPT

## 2019-06-06 NOTE — THERAPY TREATMENT NOTE
Outpatient Physical Therapy Ortho Treatment Note  [facility]     Patient Name: Lee Deal  : 1970  MRN: [id number]  Today's Date: 2019      Visit Date: 2019    Visit Dx:    ICD-10-CM ICD-9-CM   1. Cervical strain, acute, initial encounter S16.1XXA 847.0       There is no problem list on file for this patient.       Past Medical History:   Diagnosis Date   • Anxiety    • Depression    • Hypertension         Past Surgical History:   Procedure Laterality Date   • JOINT REPLACEMENT Left     TKR (post)                       PT Assessment/Plan     Row Name 19 1103          PT Assessment    Assessment Comments  Following manual therapy, pt. reported a decrease in pain. At the begining of treatment, he displayed painful reponse to cervical rotation bilaterally. Following STM, pt. was able to perform B cervical rotation for 1 min x 2 w/out c/o pain. When performing chin tucks, pt. would exaggerate forward head posture and I instructed him to tuck back from neutral. After correction, pt. was able to demonstrate correct form.   -AF        PT Plan    PT Plan Comments  Continue to apply manual therapy and increase cervical ROM. Bolster HEP.   -AF       User Key  (r) = Recorded By, (t) = Taken By, (c) = Cosigned By    Initials Name Provider Type    AF Mariann Adam PTA Physical Therapy Assistant            Exercises     Row Name 19 1101             Subjective Comments    Subjective Comments  Pt. reports his pain is elevated today. It may be the way he slept and/or the weather.   -AF         Subjective Pain    Able to rate subjective pain?  yes  -AF      Pre-Treatment Pain Level  8  -AF      Post-Treatment Pain Level  5  -AF      Subjective Pain Comment  neck  -AF         Total Minutes    31996 - PT Therapeutic Exercise Minutes  15  -AF      63227 - PT Manual Therapy Minutes  30  -AF         Exercise 1    Exercise Name 1  B cervical rotation  -AF      Cueing 1  Verbal;Demo  -AF       Sets 1  2  -AF      Time 1  1 min  -AF      Additional Comments  seated w/ B UE supported on pillow and raised table  -AF         Exercise 2    Exercise Name 2  cervical flex/ext  -AF      Cueing 2  Verbal;Demo  -AF      Sets 2  2  -AF      Time 2  1 min  -AF      Additional Comments  seated w/ B UE supported on pillow and raised table  -AF         Exercise 3    Exercise Name 3  chin tucks  -AF      Cueing 3  Verbal;Demo  -AF      Sets 3  2  -AF      Reps 3  10  -AF        User Key  (r) = Recorded By, (t) = Taken By, (c) = Cosigned By    Initials Name Provider Type    AF [place], Mariann, PTA Physical Therapy Assistant                      Manual Rx (last 36 hours)      Manual Treatments     Row Name 06/06/19 1103             Total Minutes    55847 - PT Manual Therapy Minutes  30  -AF         Manual Rx 1    Manual Rx 1 Location  thoracic  -AF      Manual Rx 1 Type  IASTM w/ blue ratchet roller  -AF      Manual Rx 1 Duration  4  -AF         Manual Rx 2    Manual Rx 2 Location  thoracic  -AF      Manual Rx 2 Type  extension mobilizations  -AF      Manual Rx 2 Duration  4  -AF         Manual Rx 3    Manual Rx 3 Location  cervical  -AF      Manual Rx 3 Type  manual distraction w/ side bends  -AF      Manual Rx 3 Duration  10  -AF         Manual Rx 4    Manual Rx 4 Location  B UT, LS, & suboccipitals  -AF      Manual Rx 4 Type  STM w/ massage cream seated w/ B UE supported on pillow and raised table  -AF      Manual Rx 4 Duration  12  -AF        User Key  (r) = Recorded By, (t) = Taken By, (c) = Cosigned By    Initials Name Provider Type    AF [place], Mariann, PTA Physical Therapy Assistant          PT OP Goals     Row Name 06/06/19 1103          PT Short Term Goals    STG Date to Achieve  06/07/19  -AF     STG 1  Improve muscle guarding in UT and scalenes  -AF     STG 1 Progress  Ongoing  -AF     STG 2  Improve mobility of upper thoracic spine  -AF     STG 2 Progress  Ongoing  -AF        Long Term Goals    LTG Date  to Achieve  06/28/19  -AF     LTG 1  Improve good cervical rotation to symmetrical without pain  -AF     LTG 1 Progress  Ongoing  -AF     LTG 1 Progress Comments  Pt. performed seated B cervical rotation 2x 1 min following STM to neck. The second time performing, pt. did not report pain.   -AF     LTG 2  Improve cervical extension to 75% without pain  -AF     LTG 2 Progress  Ongoing  -AF     LTG 3  Able to perform household activities without exacerbation of pain  -AF     LTG 3 Progress  Ongoing  -AF     LTG 4  Independent with HEP for ROM   -AF     LTG 4 Progress  Ongoing  -AF     LTG 5  Improve NDI to <10%  -AF     LTG 5 Progress  Ongoing  -AF        Time Calculation    PT Goal Re-Cert Due Date  06/16/19  -AF       User Key  (r) = Recorded By, (t) = Taken By, (c) = Cosigned By    Initials Name Provider Type    AF [place], Mariann, PTA Physical Therapy Assistant          Therapy Education  Education Details: correct form for chin tuck  Given: Posture/body mechanics  Program: Reinforced  How Provided: Verbal, Demonstration  Provided to: Patient  Level of Understanding: Verbalized, Demonstrated              Time Calculation:   Start Time: 1103  Stop Time: 1154  Time Calculation (min): 51 min  Total Timed Code Minutes- PT: 45 minute(s)  Therapy Charges for Today     Code Description Service Date Service Provider Modifiers Qty    72915809021 HC PT THER PROC EA 15 MIN 6/6/2019 [place], Mariann, PTA GP 1    32418403620 HC PT MANUAL THERAPY EA 15 MIN 6/6/2019 [place], Mariann, PTA GP 2                    Mariann Adam PTA  6/6/2019

## 2019-06-10 ENCOUNTER — HOSPITAL ENCOUNTER (OUTPATIENT)
Dept: PHYSICAL THERAPY | Facility: HOSPITAL | Age: 49
Setting detail: THERAPIES SERIES
Discharge: HOME | End: 2019-06-10

## 2019-06-10 DIAGNOSIS — M54.2 CERVICALGIA: ICD-10-CM

## 2019-06-10 DIAGNOSIS — S16.1XXA CERVICAL STRAIN, ACUTE, INITIAL ENCOUNTER: Primary | ICD-10-CM

## 2019-06-10 PROCEDURE — 97140 MANUAL THERAPY 1/> REGIONS: CPT

## 2019-06-10 PROCEDURE — 97110 THERAPEUTIC EXERCISES: CPT

## 2019-06-10 NOTE — THERAPY TREATMENT NOTE
Outpatient Physical Therapy Ortho Treatment Note   [facility]     Patient Name: Lee Deal  : 1970  MRN: [id number]  Today's Date: 6/10/2019      Visit Date: 06/10/2019    Visit Dx:    ICD-10-CM ICD-9-CM   1. Cervical strain, acute, initial encounter S16.1XXA 847.0   2. Cervicalgia M54.2 723.1       There is no problem list on file for this patient.       Past Medical History:   Diagnosis Date   • Anxiety    • Depression    • Hypertension         Past Surgical History:   Procedure Laterality Date   • JOINT REPLACEMENT Left     TKR (post)                       PT Assessment/Plan     Row Name 06/10/19 1108          PT Assessment    Assessment Comments  We continued to work to decrease soft tissue restrictions in cervical region and increase cervical mobility. At the end of treatment, while performing chin tucks, pt. reported pain in his R UT.   -AF        PT Plan    PT Plan Comments  Continue to increase cervical ROM. Added UT & LS stretches to HEP next visit.   -AF       User Key  (r) = Recorded By, (t) = Taken By, (c) = Cosigned By    Initials Name Provider Type    AF Mariann Adam, PTA Physical Therapy Assistant            Exercises     Row Name 06/10/19 1108             Subjective Comments    Subjective Comments  Pt. states he feels a lot better than last visit. While performing chin tucks, he reports pain in R UT.   -AF         Subjective Pain    Able to rate subjective pain?  yes  -AF      Pre-Treatment Pain Level  2  -AF      Post-Treatment Pain Level  3  -AF      Subjective Pain Comment  neck  -AF         Total Minutes    46413 - PT Therapeutic Exercise Minutes  13  -AF      10974 - PT Manual Therapy Minutes  32  -AF         Exercise 1    Exercise Name 1  B cervical rotation  -AF      Cueing 1  Verbal;Demo  -AF      Sets 1  2  -AF      Time 1  1 min  -AF      Additional Comments  seated w/ B UE supported on pillow and raised table  -AF         Exercise 2    Exercise Name 2  cervical  flex/ext  -AF      Cueing 2  Verbal;Demo  -AF      Sets 2  2  -AF      Time 2  1 min  -AF      Additional Comments  seated w/ B UE supported on pillow and raised table  -AF         Exercise 3    Exercise Name 3  chin tucks  -AF      Cueing 3  Verbal;Demo  -AF      Sets 3  2  -AF      Reps 3  10  -AF        User Key  (r) = Recorded By, (t) = Taken By, (c) = Cosigned By    Initials Name Provider Type    AF Cameron, Mariann, PTA Physical Therapy Assistant                      Manual Rx (last 36 hours)      Manual Treatments     Row Name 06/10/19 1108             Total Minutes    53648 - PT Manual Therapy Minutes  32  -AF         Manual Rx 1    Manual Rx 1 Location  thoracic  -AF      Manual Rx 1 Type  IASTM w/ blue ratchet roller  -AF      Manual Rx 1 Duration  4  -AF         Manual Rx 2    Manual Rx 2 Location  thoracic  -AF      Manual Rx 2 Type  extension mobilizations  -AF      Manual Rx 2 Duration  4  -AF         Manual Rx 3    Manual Rx 3 Location  cervical  -AF      Manual Rx 3 Type  manual distraction w/ side bends  -AF      Manual Rx 3 Duration  10  -AF         Manual Rx 4    Manual Rx 4 Location  B UT, LS, & suboccipitals  -AF      Manual Rx 4 Type  STM w/ massage cream seated w/ B UE supported on pillow and raised table  -AF      Manual Rx 4 Duration  14  -AF        User Key  (r) = Recorded By, (t) = Taken By, (c) = Cosigned By    Initials Name Provider Type    AF Cameron, Mariann, PTA Physical Therapy Assistant          PT OP Goals     Row Name 06/10/19 1108          PT Short Term Goals    STG Date to Achieve  06/07/19  -AF     STG 1  Improve muscle guarding in UT and scalenes  -AF     STG 1 Progress  Ongoing  -AF     STG 2  Improve mobility of upper thoracic spine  -AF     STG 2 Progress  Ongoing  -AF        Long Term Goals    LTG Date to Achieve  06/28/19  -AF     LTG 1  Improve good cervical rotation to symmetrical without pain  -AF     LTG 1 Progress  Ongoing  -AF     LTG 2  Improve cervical extension  to 75% without pain  -AF     LTG 2 Progress  Ongoing  -AF     LTG 2 Progress Comments  Pt. reports more discomfort flexing neck than extending it.   -AF     LTG 3  Able to perform household activities without exacerbation of pain  -AF     LTG 3 Progress  Ongoing  -AF     LTG 4  Independent with HEP for ROM   -AF     LTG 4 Progress  Ongoing  -AF     LTG 5  Improve NDI to <10%  -AF     LTG 5 Progress  Ongoing  -AF        Time Calculation    PT Goal Re-Cert Due Date  06/16/19  -AF       User Key  (r) = Recorded By, (t) = Taken By, (c) = Cosigned By    Initials Name Provider Type    AF Hoke, Mariann, PTA Physical Therapy Assistant          Therapy Education  Given: Symptoms/condition management, Posture/body mechanics  Program: Reinforced  How Provided: Verbal, Demonstration  Provided to: Patient  Level of Understanding: Demonstrated, Verbalized              Time Calculation:   Start Time: 1108  Stop Time: 1158  Time Calculation (min): 50 min  Total Timed Code Minutes- PT: 45 minute(s)  Therapy Charges for Today     Code Description Service Date Service Provider Modifiers Qty    02044790532 HC PT THER PROC EA 15 MIN 6/10/2019 Hoke, Mariann, PTA GP 1    63035745787 HC PT MANUAL THERAPY EA 15 MIN 6/10/2019 Hoke, Mariann, PTA GP 2                    Mariann Hoke, PTA  6/10/2019

## 2019-06-12 ENCOUNTER — HOSPITAL ENCOUNTER (OUTPATIENT)
Dept: PHYSICAL THERAPY | Facility: HOSPITAL | Age: 49
Setting detail: THERAPIES SERIES
Discharge: HOME | End: 2019-06-12

## 2019-06-12 DIAGNOSIS — M54.2 CERVICALGIA: ICD-10-CM

## 2019-06-12 DIAGNOSIS — S16.1XXA CERVICAL STRAIN, ACUTE, INITIAL ENCOUNTER: Primary | ICD-10-CM

## 2019-06-12 PROCEDURE — 97140 MANUAL THERAPY 1/> REGIONS: CPT

## 2019-06-12 PROCEDURE — 97110 THERAPEUTIC EXERCISES: CPT

## 2019-06-12 NOTE — THERAPY TREATMENT NOTE
Outpatient Physical Therapy Ortho Treatment Note  [facility]     Patient Name: Lee Deal  : 1970  MRN: [id number]  Today's Date: 2019      Visit Date: 2019    Visit Dx:    ICD-10-CM ICD-9-CM   1. Cervical strain, acute, initial encounter S16.1XXA 847.0   2. Cervicalgia M54.2 723.1       There is no problem list on file for this patient.       Past Medical History:   Diagnosis Date   • Anxiety    • Depression    • Hypertension         Past Surgical History:   Procedure Laterality Date   • JOINT REPLACEMENT Left     TKR (post)                       PT Assessment/Plan     Row Name 19 1101          PT Assessment    Assessment Comments  Today I progressed scapular strengthening exercises as well as bolstered HEP to increase cervical ROM. He demonstrates increased guarding in his L cervical region vs his R, but overall, he has been able to tolerate palpation better than before. Pt. denied pain post treatment.   -AF        PT Plan    PT Plan Comments  Continue to increase cervical ROM and postural strengthening.   -AF       User Key  (r) = Recorded By, (t) = Taken By, (c) = Cosigned By    Initials Name Provider Type    AF Mariann Adam, PTA Physical Therapy Assistant            Exercises     Row Name 19 1108             Subjective Comments    Subjective Comments  Pt. reports feeling sore after last visit that lasted until that evening.   -AF         Subjective Pain    Able to rate subjective pain?  yes  -AF      Pre-Treatment Pain Level  1  -AF      Post-Treatment Pain Level  0  -AF      Subjective Pain Comment  neck  -AF         Total Minutes    09252 - PT Therapeutic Exercise Minutes  15  -AF      94956 - PT Manual Therapy Minutes  35  -AF         Exercise 1    Exercise Name 1  horizontal abd  -AF      Cueing 1  Verbal;Demo  -AF      Sets 1  2  -AF      Reps 1  10  -AF      Additional Comments  supine; yellow TB  -AF         Exercise 2    Exercise Name 2  B UE diagonals  -AF       Cueing 2  Verbal;Demo  -AF      Sets 2  2  -AF      Reps 2  10  -AF      Additional Comments  supine; yellow TB   -AF         Exercise 3    Exercise Name 3  reviewed HEP  -AF      Additional Comments  Added self-UT & LS stretches  -AF        User Key  (r) = Recorded By, (t) = Taken By, (c) = Cosigned By    Initials Name Provider Type    AF [place], Mariann, PTA Physical Therapy Assistant                      Manual Rx (last 36 hours)      Manual Treatments     Row Name 06/12/19 1107             Total Minutes    82215 - PT Manual Therapy Minutes  35  -AF         Manual Rx 1    Manual Rx 1 Location  thoracic  -AF      Manual Rx 1 Type  extension mobilizations  -AF      Manual Rx 1 Duration  5  -AF         Manual Rx 2    Manual Rx 2 Location  cervical  -AF      Manual Rx 2 Type  manual distraction w/ side bends  -AF      Manual Rx 2 Duration  10  -AF         Manual Rx 3    Manual Rx 3 Location  B UT, LS, & suboccipitals  -AF      Manual Rx 3 Type  STM w/ massage cream seated w/ B UE supported on pillow and raised table  -AF      Manual Rx 3 Duration  20  -AF        User Key  (r) = Recorded By, (t) = Taken By, (c) = Cosigned By    Initials Name Provider Type    AF [place], Mariann, PTA Physical Therapy Assistant          PT OP Goals     Row Name 06/12/19 1107          PT Short Term Goals    STG Date to Achieve  06/07/19  -AF     STG 1  Improve muscle guarding in UT and scalenes  -AF     STG 1 Progress  Ongoing  -AF     STG 2  Improve mobility of upper thoracic spine  -AF     STG 2 Progress  Ongoing  -AF        Long Term Goals    LTG Date to Achieve  06/28/19  -AF     LTG 1  Improve good cervical rotation to symmetrical without pain  -AF     LTG 1 Progress  Ongoing  -AF     LTG 2  Improve cervical extension to 75% without pain  -AF     LTG 2 Progress  Ongoing  -AF     LTG 3  Able to perform household activities without exacerbation of pain  -AF     LTG 3 Progress  Ongoing  -AF     LTG 4  Independent with HEP for  ROM   -AF     LTG 4 Progress  Ongoing  -AF     LTG 4 Progress Comments  Bolstered HEP today to include UT & LS stretches.   -AF     LTG 5  Improve NDI to <10%  -AF     LTG 5 Progress  Ongoing  -AF        Time Calculation    PT Goal Re-Cert Due Date  06/16/19  -AF       User Key  (r) = Recorded By, (t) = Taken By, (c) = Cosigned By    Initials Name Provider Type    AF Kia Mariann, PTA Physical Therapy Assistant          Therapy Education  Education Details: Added self UT & LS stretches to HEP  Given: HEP  Program: New  How Provided: Verbal, Demonstration, Written  Provided to: Patient  Level of Understanding: Verbalized, Demonstrated              Time Calculation:   Start Time: 1107  Stop Time: 1200  Time Calculation (min): 53 min  Total Timed Code Minutes- PT: 50 minute(s)  Therapy Charges for Today     Code Description Service Date Service Provider Modifiers Qty    99149416028 HC PT THER PROC EA 15 MIN 6/12/2019 [place], Mariann, PTA GP 1    62044480408 HC PT MANUAL THERAPY EA 15 MIN 6/12/2019 [place], Mariann, PTA GP 2                    Mariannsavannah Adam, PTA  6/12/2019

## 2019-06-17 ENCOUNTER — HOSPITAL ENCOUNTER (OUTPATIENT)
Dept: PHYSICAL THERAPY | Facility: HOSPITAL | Age: 49
Setting detail: THERAPIES SERIES
Discharge: HOME | End: 2019-06-17

## 2019-06-17 DIAGNOSIS — S16.1XXA CERVICAL STRAIN, ACUTE, INITIAL ENCOUNTER: Primary | ICD-10-CM

## 2019-06-17 DIAGNOSIS — M54.2 CERVICALGIA: ICD-10-CM

## 2019-06-17 PROCEDURE — 97110 THERAPEUTIC EXERCISES: CPT | Performed by: PHYSICAL THERAPIST

## 2019-06-17 PROCEDURE — 97140 MANUAL THERAPY 1/> REGIONS: CPT | Performed by: PHYSICAL THERAPIST

## 2019-06-17 NOTE — THERAPY DISCHARGE NOTE
Outpatient Physical Therapy Ortho Treatment Note/Discharge Summary  [facility]     Patient Name: Lee Deal  : 1970  MRN: [id number]  Today's Date: 2019      Visit Date: 2019    Visit Dx:    ICD-10-CM ICD-9-CM   1. Cervical strain, acute, initial encounter S16.1XXA 847.0   2. Cervicalgia M54.2 723.1       There is no problem list on file for this patient.       Past Medical History:   Diagnosis Date   • Anxiety    • Depression    • Hypertension         Past Surgical History:   Procedure Laterality Date   • JOINT REPLACEMENT Left     TKR (post)       PT Ortho     Row Name 19 1100       Cervical Palpation    Suboccipital  -- no tenderness  -KR    Cervical Facets  Left:;Tender C4 transverse process  -KR    Scalenes  Bilateral:;Guarded/taut minimal  -KR    Upper Traps  Bilateral:;Guarded/taut minimal  -KR       Cervical/Thoracic Special Tests    Cervical Compression (Forarminal Compression vs. Facet Pain)  Negative  -KR    Cervical Distraction (Foraminal Compression vs. Facet Pain)  Negative  -KR       Head/Neck/Trunk    Neck Extension AROM  80%  -KR    Neck Flexion AROM  WNL  -KR    Neck Lt Rotation AROM  95%  -KR    Neck Rt Rotation AROM  95%  -KR    Head/Neck/Trunk Comments  all motions painless  -KR       MMT (Manual Muscle Testing)    General MMT Comments  Bilateral UE WNL; Bilateral inferior trapezius, mid trapezius and rhomboids 5/5  -KR      User Key  (r) = Recorded By, (t) = Taken By, (c) = Cosigned By    Initials Name Provider Type    KR Kirstin Guadalupe, PT DPT Physical Therapist                      PT Assessment/Plan     Row Name 19 1100          PT Assessment    Assessment Comments  Mr. Deal has met all the goal of his treatment plan and demonstrates and verbalizes ability to perform home program.    -KR        PT Plan    PT Plan Comments  Discharge from PT to long term exercise program.  -KR       User Key  (r) = Recorded By, (t) = Taken By, (c) = Cosigned By     "Initials Name Provider Type    Kirstin Saldana, PT DPT Physical Therapist              Exercises     Row Name 06/17/19 1100             Subjective Comments    Subjective Comments  Patient denies pain in neck reporting that he drove to [place] on Friday and returned Sunday evening without any pain or problems.  He reports he has been able to perform normal activities at home and has returned to the gym performing previous exercise program. He states, \"I think you all have done your job.\"  -KR         Subjective Pain    Able to rate subjective pain?  yes  -KR      Pre-Treatment Pain Level  0  -KR      Post-Treatment Pain Level  0  -KR         Total Minutes    80044 - PT Therapeutic Exercise Minutes  25  -KR      83031 - PT Manual Therapy Minutes  15  -KR         Exercise 1    Exercise Name 1  Upper trapezius stretch  -KR      Cueing 1  Verbal;Demo  -KR      Sets 1  1  -KR      Reps 1  5  -KR      Time 1  10 sec hold good.   -KR         Exercise 2    Exercise Name 2  Levator scapula stretch  -KR      Cueing 2  Verbal;Demo  -KR      Sets 2  1  -KR      Reps 2  5  -KR      Time 2  10 sec hold bilaterally  -KR         Exercise 3    Exercise Name 3  instruction in proper body mechanics, lifting techniques and neck care  -KR        User Key  (r) = Recorded By, (t) = Taken By, (c) = Cosigned By    Initials Name Provider Type    KR Kirstin Guadalupe, PT DPT Physical Therapist                        Manual Rx (last 36 hours)      Manual Treatments     Row Name 06/17/19 1100             Total Minutes    05306 - PT Manual Therapy Minutes  15  -KR         Manual Rx 1    Manual Rx 1 Location  thoracic  -KR      Manual Rx 1 Type  P/A mobs transverse and spinous processes  -KR      Manual Rx 1 Duration  5  -KR         Manual Rx 2    Manual Rx 2 Location  cervical   -KR      Manual Rx 2 Type  manual distraction  -KR      Manual Rx 2 Duration  5  -KR         Manual Rx 3    Manual Rx 3 Location  cervical  -KR      Manual Rx 3 Type  " distraction w/rotation  -KR        User Key  (r) = Recorded By, (t) = Taken By, (c) = Cosigned By    Initials Name Provider Type    Kirstin Saldana, PT DPT Physical Therapist          PT OP Goals     Row Name 06/17/19 1100          PT Short Term Goals    STG Date to Achieve  06/07/19  -KR     STG 1  Improve muscle guarding in UT and scalenes  -KR     STG 1 Progress  Met  -KR     STG 2  Improve mobility of upper thoracic spine  -KR     STG 2 Progress  Met  -KR        Long Term Goals    LTG Date to Achieve  06/28/19  -KR     LTG 1  Improve good cervical rotation to symmetrical without pain  -KR     LTG 1 Progress  Met  -KR     LTG 2  Improve cervical extension to 75% without pain  -KR     LTG 2 Progress  Met  -KR     LTG 3  Able to perform household activities without exacerbation of pain  -KR     LTG 3 Progress  Met  -KR     LTG 4  Independent with HEP for ROM   -KR     LTG 4 Progress  Met  -KR     LTG 5  Improve NDI to <10%  -KR     LTG 5 Progress  Met  -KR        Time Calculation    PT Goal Re-Cert Due Date  06/16/19  -KR       User Key  (r) = Recorded By, (t) = Taken By, (c) = Cosigned By    Initials Name Provider Type    Kirstin Saldana, PT DPT Physical Therapist          Therapy Education  Education Details: Reviewed HEP, body mechanics, lifting techniques and neck care.  Discussed proper progression at gym.  Given: HEP, Symptoms/condition management, Posture/body mechanics  Program: Progressed  How Provided: Verbal, Demonstration  Provided to: Patient  Level of Understanding: Verbalized, Demonstrated              Time Calculation:   Start Time: 1100  Stop Time: 1140  Time Calculation (min): 40 min  Total Timed Code Minutes- PT: 40 minute(s)  Therapy Charges for Today     Code Description Service Date Service Provider Modifiers Qty    54390609615 HC PT THER PROC EA 15 MIN 6/17/2019 Kirstin Guadalupe, PT DPT GP 2    54286809233 HC PT MANUAL THERAPY EA 15 MIN 6/17/2019 Kirstin Guadalupe, PT DPT GP 1                OP PT  Discharge Summary  Date of Discharge: 06/17/19  Reason for Discharge: All goals achieved  Outcomes Achieved: Able to achieve all goals within established timeline  Discharge Destination: Home with home program  Discharge Instructions/Additional Comments: Patient has met all goals of treatment plan.  He is able to perform ADL's and gym exercises without neck pain.  HE has been instructed in long term exercise program.      Kirstin Guadalupe, PT DPT  6/17/2019

## 2019-06-19 ENCOUNTER — APPOINTMENT (OUTPATIENT)
Dept: PHYSICAL THERAPY | Facility: HOSPITAL | Age: 49
End: 2019-06-19

## 2019-06-24 ENCOUNTER — APPOINTMENT (OUTPATIENT)
Dept: PHYSICAL THERAPY | Facility: HOSPITAL | Age: 49
End: 2019-06-24

## 2019-06-26 ENCOUNTER — APPOINTMENT (OUTPATIENT)
Dept: PHYSICAL THERAPY | Facility: HOSPITAL | Age: 49
End: 2019-06-26

## 2019-11-01 ENCOUNTER — APPOINTMENT (OUTPATIENT)
Dept: LAB | Facility: HOSPITAL | Age: 49
End: 2019-11-01

## 2019-11-01 ENCOUNTER — TRANSCRIBE ORDERS (OUTPATIENT)
Dept: ADMINISTRATIVE | Facility: HOSPITAL | Age: 49
End: 2019-11-01

## 2019-11-01 DIAGNOSIS — N46.9 INFERTILITY MALE: Primary | ICD-10-CM

## 2019-11-01 LAB
CHARACTER SMN: NORMAL
COLLECTION METHOD SMN: ABNORMAL
EPI CELLS #/AREA SMN HPF: ABNORMAL /HPF
LIQUEFACTION TIME SMN: NORMAL MIN
NON PROGRESSIVE MOTILITY: 19.8 %
RBC #/AREA SMN HPF: ABNORMAL /HPF
SEX ABSTIN DURATION TIME PATIENT: 5 DAYS
SLOW PROGRESSIVE MOTILITY: 38.4 %
SMI: 89 (ref 80–400)
SPEC CONTAINER SPEC: ABNORMAL
SPECIMEN VOL SMN: 1 ML
SPERM # SMN: 31.8 MILLIONS/ML
SPERM IMMOTILE NFR SMN: 32.1 %
SPERM MOTILE NFR SMN: 67.9 % MOTILE (ref 50–100)
SPERM PROG NFR SMN: 9.7 % (ref 25–100)
SPERM SMN: 15.5 % NORMAL (ref 15–100)
VISC SMN: NORMAL CP
WBC SMN QL: ABNORMAL /HPF
WHO STANDARD: ABNORMAL

## 2019-11-01 PROCEDURE — 89320 SEMEN ANAL VOL/COUNT/MOT: CPT | Performed by: INTERNAL MEDICINE

## 2020-03-08 ENCOUNTER — HOSPITAL ENCOUNTER (EMERGENCY)
Age: 50
Discharge: HOME | End: 2020-03-08
Payer: MEDICAID

## 2020-03-08 VITALS
OXYGEN SATURATION: 98 % | BODY MASS INDEX: 39.17 KG/M2 | SYSTOLIC BLOOD PRESSURE: 147 MMHG | HEART RATE: 88 BPM | WEIGHT: 315 LBS | RESPIRATION RATE: 18 BRPM | DIASTOLIC BLOOD PRESSURE: 94 MMHG | HEIGHT: 75 IN | TEMPERATURE: 98 F

## 2020-03-08 PROCEDURE — 6360000002 HC RX W HCPCS: Performed by: NURSE PRACTITIONER

## 2020-03-08 PROCEDURE — 99283 EMERGENCY DEPT VISIT LOW MDM: CPT

## 2020-03-08 PROCEDURE — 96372 THER/PROPH/DIAG INJ SC/IM: CPT

## 2020-03-08 PROCEDURE — 6370000000 HC RX 637 (ALT 250 FOR IP): Performed by: NURSE PRACTITIONER

## 2020-03-08 RX ORDER — METHYLPREDNISOLONE SODIUM SUCCINATE 125 MG/2ML
60 INJECTION, POWDER, LYOPHILIZED, FOR SOLUTION INTRAMUSCULAR; INTRAVENOUS ONCE
Status: COMPLETED | OUTPATIENT
Start: 2020-03-08 | End: 2020-03-08

## 2020-03-08 RX ORDER — METHYLPREDNISOLONE 4 MG/1
TABLET ORAL
Qty: 1 KIT | Refills: 0 | Status: SHIPPED | OUTPATIENT
Start: 2020-03-08 | End: 2020-03-08 | Stop reason: SDUPTHER

## 2020-03-08 RX ORDER — HYDROCODONE BITARTRATE AND ACETAMINOPHEN 10; 325 MG/1; MG/1
1 TABLET ORAL ONCE
Status: COMPLETED | OUTPATIENT
Start: 2020-03-08 | End: 2020-03-08

## 2020-03-08 RX ORDER — HYDROCODONE BITARTRATE AND ACETAMINOPHEN 10; 325 MG/1; MG/1
1 TABLET ORAL EVERY 6 HOURS PRN
Qty: 10 TABLET | Refills: 0 | Status: SHIPPED | OUTPATIENT
Start: 2020-03-08 | End: 2020-03-11

## 2020-03-08 RX ORDER — METHYLPREDNISOLONE 4 MG/1
TABLET ORAL
Qty: 1 KIT | Refills: 0 | Status: SHIPPED | OUTPATIENT
Start: 2020-03-08 | End: 2020-03-14

## 2020-03-08 RX ORDER — HYDROCODONE BITARTRATE AND ACETAMINOPHEN 10; 325 MG/1; MG/1
1 TABLET ORAL EVERY 6 HOURS PRN
Qty: 120 TABLET | Refills: 0 | Status: SHIPPED | OUTPATIENT
Start: 2020-03-08 | End: 2020-03-08 | Stop reason: SDUPTHER

## 2020-03-08 RX ADMIN — METHYLPREDNISOLONE SODIUM SUCCINATE 60 MG: 125 INJECTION, POWDER, FOR SOLUTION INTRAMUSCULAR; INTRAVENOUS at 14:39

## 2020-03-08 RX ADMIN — HYDROCODONE BITARTRATE AND ACETAMINOPHEN 1 TABLET: 10; 325 TABLET ORAL at 14:39

## 2020-03-08 ASSESSMENT — PAIN DESCRIPTION - PAIN TYPE: TYPE: ACUTE PAIN

## 2020-03-08 ASSESSMENT — PAIN DESCRIPTION - ORIENTATION: ORIENTATION: LEFT

## 2020-03-08 ASSESSMENT — PAIN SCALES - GENERAL
PAINLEVEL_OUTOF10: 10
PAINLEVEL_OUTOF10: 10

## 2020-03-08 ASSESSMENT — PAIN DESCRIPTION - LOCATION: LOCATION: TOE (COMMENT WHICH ONE)

## 2020-03-08 NOTE — ED PROVIDER NOTES
[facility]  eMERGENCY dEPARTMENT eNCOUnter      Pt Name: Gracy Yost  MRN: [id number]  [place] 1970  Date of evaluation: 3/8/2020  Provider: Cole Chen, [address]       Chief Complaint   Patient presents with    Gout     great toe in left foot         HISTORY OF PRESENT ILLNESS   (Location/Symptom, Timing/Onset,Context/Setting, Quality, Duration, Modifying Factors, Severity)  Note limiting factors. Dina Corrales a 52 y.o. male who presents to the emergency department for evaluation of gout. Pt tells me that he has history of gouty arthritis developing same pain as previous experience to left great toe. He tells me that he has flare up of gout every 6 months. He denies fever as well as constitutional symptoms of illness. He has had no fevers or constitutional symptoms of illness. [facility]    Nursing Notes were reviewed. REVIEW OF SYSTEMS    (2-9 systems for level 4, 10 or more for level 5)     Review of Systems   Constitutional: Negative. Musculoskeletal: Positive for arthralgias (left great toe). A complete review of systems was performed and is negative except as noted above in the HPI. PAST MEDICAL HISTORY     Past Medical History:   Diagnosis Date    Arthritis     Depression     Hypertension     Vitamin D deficiency          SURGICAL HISTORY       Past Surgical History:   Procedure Laterality Date    APPENDECTOMY      JOINT REPLACEMENT Left     hip         CURRENT MEDICATIONS       Discharge Medication List as of 3/8/2020  3:14 PM      CONTINUE these medications which have NOT CHANGED    Details   naproxen (NAPROSYN) 500 MG tablet Take 1 tablet by mouth 2 times daily, Disp-20 tablet, R-0Print      meloxicam (MOBIC) 15 MG tablet Take 15 mg by mouth dailyHistorical Med      pantoprazole (PROTONIX) 40 MG tablet Take 1 tablet by mouth daily, Disp-30 tablet, R-1Print             ALLERGIES     Patient has no known allergies. FAMILY HISTORY     History reviewed. Strong DP/PT pulses left foot  No redness or discoloration to left great toe   Skin:     General: Skin is warm and dry. Neurological:      Mental Status: He is alert and oriented to person, place, and time. DIAGNOSTIC RESULTS     EKG: All EKG's are interpreted by the Emergency Department Physician who either signs or Co-signs this chart in the absence of acardiologist.        RADIOLOGY:   Non-plain film images such as CT, Ultrasound andMRI are read by the radiologist. Plain radiographic images are visualized and preliminarily interpreted by the emergency physician with the below findings:        Interpretation per the Radiologist below, if available at the time of this note:    No orders to display         ED BEDSIDE ULTRASOUND:   Performed by ED Physician - none    LABS:  Labs Reviewed - No data to display    All other labs were within normal range or not returned as of this dictation. RE-ASSESSMENT           EMERGENCY DEPARTMENT COURSE and DIFFERENTIALDIAGNOSIS/MDM:   Vitals:    Vitals:    03/08/20 1329 03/08/20 1330   BP:  (!) 147/94   Pulse:  88   Resp:  18   Temp:  98 °F (36.7 °C)   TempSrc:  Oral   SpO2:  98%   Weight: (!) 330 lb (149.7 kg)    Height: 6' 2.5\" (1.892 m)        MDM      CONSULTS:  None    PROCEDURES:  Unless otherwise notedbelow, none     Procedures    FINAL IMPRESSION     1. Acute gout involving toe of left foot, unspecified cause          DISPOSITION/PLAN   DISPOSITION Decision To Discharge 03/08/2020 03:02:43 PM      [facility]  [facility]  [phone]    Schedule an appointment as soon as possible for a visit         DISCHARGE MEDICATIONS:    Attestation: The Prescription Monitoring Report for this patient was reviewed today. ([id number] ) VERNELL Dockery  Periodic Controlled Substance Monitoring: Possible medication side effects, risk of tolerance/dependence & alternative treatments discussed., No signs of potential drug

## 2020-04-27 ENCOUNTER — HOSPITAL ENCOUNTER (EMERGENCY)
Age: 50
Discharge: HOME | End: 2020-04-27
Payer: MEDICAID

## 2020-04-27 ENCOUNTER — APPOINTMENT (OUTPATIENT)
Dept: GENERAL RADIOLOGY | Age: 50
End: 2020-04-27
Payer: MEDICAID

## 2020-04-27 VITALS
HEART RATE: 72 BPM | RESPIRATION RATE: 18 BRPM | OXYGEN SATURATION: 95 % | DIASTOLIC BLOOD PRESSURE: 85 MMHG | SYSTOLIC BLOOD PRESSURE: 139 MMHG | TEMPERATURE: 98.6 F

## 2020-04-27 LAB
ALBUMIN SERPL-MCNC: 4.6 G/DL (ref 3.5–5.2)
ALP BLD-CCNC: 76 U/L (ref 40–130)
ALT SERPL-CCNC: 36 U/L (ref 5–41)
ANION GAP SERPL CALCULATED.3IONS-SCNC: 15 MMOL/L (ref 7–19)
AST SERPL-CCNC: 30 U/L (ref 5–40)
BASOPHILS ABSOLUTE: 0.1 K/UL (ref 0–0.2)
BASOPHILS RELATIVE PERCENT: 1 % (ref 0–1)
BILIRUB SERPL-MCNC: 0.3 MG/DL (ref 0.2–1.2)
BUN BLDV-MCNC: 24 MG/DL (ref 6–20)
C-REACTIVE PROTEIN: 4.12 MG/DL (ref 0–0.5)
CALCIUM SERPL-MCNC: 10.4 MG/DL (ref 8.6–10)
CHLORIDE BLD-SCNC: 92 MMOL/L (ref 98–111)
CO2: 29 MMOL/L (ref 22–29)
CREAT SERPL-MCNC: 1.5 MG/DL (ref 0.5–1.2)
EOSINOPHILS ABSOLUTE: 0.2 K/UL (ref 0–0.6)
EOSINOPHILS RELATIVE PERCENT: 2.3 % (ref 0–5)
GFR NON-AFRICAN AMERICAN: 50
GLUCOSE BLD-MCNC: 110 MG/DL (ref 74–109)
HCT VFR BLD CALC: 43.4 % (ref 42–52)
HEMOGLOBIN: 13.9 G/DL (ref 14–18)
IMMATURE GRANULOCYTES #: 0.1 K/UL
LYMPHOCYTES ABSOLUTE: 1.5 K/UL (ref 1.1–4.5)
LYMPHOCYTES RELATIVE PERCENT: 18.3 % (ref 20–40)
MCH RBC QN AUTO: 27.9 PG (ref 27–31)
MCHC RBC AUTO-ENTMCNC: 32 G/DL (ref 33–37)
MCV RBC AUTO: 87 FL (ref 80–94)
MONOCYTES ABSOLUTE: 0.6 K/UL (ref 0–0.9)
MONOCYTES RELATIVE PERCENT: 7 % (ref 0–10)
NEUTROPHILS ABSOLUTE: 5.8 K/UL (ref 1.5–7.5)
NEUTROPHILS RELATIVE PERCENT: 70.1 % (ref 50–65)
PDW BLD-RTO: 12.4 % (ref 11.5–14.5)
PLATELET # BLD: 265 K/UL (ref 130–400)
PMV BLD AUTO: 11.3 FL (ref 9.4–12.4)
POTASSIUM SERPL-SCNC: 3.7 MMOL/L (ref 3.5–5)
RBC # BLD: 4.99 M/UL (ref 4.7–6.1)
SODIUM BLD-SCNC: 136 MMOL/L (ref 136–145)
TOTAL PROTEIN: 8.6 G/DL (ref 6.6–8.7)
URIC ACID, SERUM: 13.1 MG/DL (ref 3.4–7)
WBC # BLD: 8.3 K/UL (ref 4.8–10.8)

## 2020-04-27 PROCEDURE — 84550 ASSAY OF BLOOD/URIC ACID: CPT

## 2020-04-27 PROCEDURE — 85025 COMPLETE CBC W/AUTO DIFF WBC: CPT

## 2020-04-27 PROCEDURE — 6360000002 HC RX W HCPCS: Performed by: NURSE PRACTITIONER

## 2020-04-27 PROCEDURE — 73610 X-RAY EXAM OF ANKLE: CPT

## 2020-04-27 PROCEDURE — 80053 COMPREHEN METABOLIC PANEL: CPT

## 2020-04-27 PROCEDURE — 99283 EMERGENCY DEPT VISIT LOW MDM: CPT

## 2020-04-27 PROCEDURE — 96374 THER/PROPH/DIAG INJ IV PUSH: CPT

## 2020-04-27 PROCEDURE — 73630 X-RAY EXAM OF FOOT: CPT

## 2020-04-27 PROCEDURE — 86140 C-REACTIVE PROTEIN: CPT

## 2020-04-27 PROCEDURE — 96375 TX/PRO/DX INJ NEW DRUG ADDON: CPT

## 2020-04-27 PROCEDURE — 36415 COLL VENOUS BLD VENIPUNCTURE: CPT

## 2020-04-27 RX ORDER — HYDROCODONE BITARTRATE AND ACETAMINOPHEN 10; 325 MG/1; MG/1
1 TABLET ORAL EVERY 6 HOURS PRN
Qty: 10 TABLET | Refills: 0 | Status: SHIPPED | OUTPATIENT
Start: 2020-04-27 | End: 2020-04-30

## 2020-04-27 RX ORDER — KETOROLAC TROMETHAMINE 30 MG/ML
30 INJECTION, SOLUTION INTRAMUSCULAR; INTRAVENOUS ONCE
Status: COMPLETED | OUTPATIENT
Start: 2020-04-27 | End: 2020-04-27

## 2020-04-27 RX ORDER — METHYLPREDNISOLONE 4 MG/1
TABLET ORAL
Qty: 1 KIT | Refills: 0 | Status: SHIPPED | OUTPATIENT
Start: 2020-04-27 | End: 2020-05-03

## 2020-04-27 RX ORDER — METHYLPREDNISOLONE SODIUM SUCCINATE 125 MG/2ML
125 INJECTION, POWDER, LYOPHILIZED, FOR SOLUTION INTRAMUSCULAR; INTRAVENOUS ONCE
Status: COMPLETED | OUTPATIENT
Start: 2020-04-27 | End: 2020-04-27

## 2020-04-27 RX ORDER — MORPHINE SULFATE 4 MG/ML
4 INJECTION, SOLUTION INTRAMUSCULAR; INTRAVENOUS ONCE
Status: COMPLETED | OUTPATIENT
Start: 2020-04-27 | End: 2020-04-27

## 2020-04-27 RX ADMIN — MORPHINE SULFATE 4 MG: 4 INJECTION INTRAVENOUS at 19:21

## 2020-04-27 RX ADMIN — METHYLPREDNISOLONE SODIUM SUCCINATE 125 MG: 125 INJECTION, POWDER, FOR SOLUTION INTRAMUSCULAR; INTRAVENOUS at 19:21

## 2020-04-27 RX ADMIN — KETOROLAC TROMETHAMINE 30 MG: 30 INJECTION, SOLUTION INTRAMUSCULAR at 19:21

## 2020-04-27 ASSESSMENT — PAIN SCALES - GENERAL
PAINLEVEL_OUTOF10: 6
PAINLEVEL_OUTOF10: 10
PAINLEVEL_OUTOF10: 10

## 2020-04-27 ASSESSMENT — PAIN DESCRIPTION - DESCRIPTORS: DESCRIPTORS: CONSTANT

## 2020-04-27 ASSESSMENT — PAIN DESCRIPTION - ORIENTATION: ORIENTATION: LEFT

## 2020-04-27 ASSESSMENT — PAIN DESCRIPTION - PAIN TYPE: TYPE: ACUTE PAIN

## 2020-04-27 ASSESSMENT — PAIN DESCRIPTION - LOCATION
LOCATION: FOOT
LOCATION: FOOT

## 2020-04-28 NOTE — ED PROVIDER NOTES
[facility]  eMERGENCY dEPARTMENT eNCOUnter      Pt Name: Abel Vadlez  MRN: [id number]  [place] 1970  Date of evaluation: 4/27/2020  Provider: En Lira, [address]       Chief Complaint   Patient presents with    Foot Pain     left; no known injury         HISTORY OF PRESENT ILLNESS   (Location/Symptom, Timing/Onset,Context/Setting, Quality, Duration, Modifying Factors, Severity)  Note limiting factors. Kaleb York a 52 y.o. male who presents to the emergency department for evaluation of foot pain. Pt tells me that he has had left ankle and foot pain over past 2 days. He gives history of gout. He tells me that he has not previously experienced gout in left ankle. He denies fall as well as specific injury to his left ankle. He has had no fevers or constitutional symptoms of illness. He tells me that he has severe left ankle/foot pain with weight bearing. He has had swelling of left ankle/foot. He has pain with movement of left ankle. He has had no left leg pain or swelling. He denies history of dvt. [facility]    Nursing Notes were reviewed. REVIEW OF SYSTEMS    (2-9 systems for level 4, 10 or more for level 5)     Review of Systems   Constitutional: Negative. Musculoskeletal: Positive for arthralgias (left ankle). A complete review of systems was performed and is negative except as noted above in the HPI.        PAST MEDICAL HISTORY     Past Medical History:   Diagnosis Date    Arthritis     Depression     Hypertension     Vitamin D deficiency          SURGICAL HISTORY       Past Surgical History:   Procedure Laterality Date    APPENDECTOMY      JOINT REPLACEMENT Left     hip         CURRENT MEDICATIONS       Discharge Medication List as of 4/27/2020  9:00 PM      CONTINUE these medications which have NOT CHANGED    Details   naproxen (NAPROSYN) 500 MG tablet Take 1 tablet by mouth 2 times daily, Disp-20 tablet, R-0Print      meloxicam (MOBIC) 15 MG tablet Take 15 mg by

## 2020-06-17 ENCOUNTER — APPOINTMENT (OUTPATIENT)
Dept: CT IMAGING | Age: 50
End: 2020-06-17
Payer: COMMERCIAL

## 2020-06-17 ENCOUNTER — HOSPITAL ENCOUNTER (EMERGENCY)
Age: 50
Discharge: HOME | End: 2020-06-17
Payer: COMMERCIAL

## 2020-06-17 VITALS
BODY MASS INDEX: 39.9 KG/M2 | HEART RATE: 77 BPM | TEMPERATURE: 97.5 F | OXYGEN SATURATION: 96 % | WEIGHT: 315 LBS | RESPIRATION RATE: 16 BRPM | SYSTOLIC BLOOD PRESSURE: 143 MMHG | DIASTOLIC BLOOD PRESSURE: 93 MMHG

## 2020-06-17 PROCEDURE — 72131 CT LUMBAR SPINE W/O DYE: CPT

## 2020-06-17 PROCEDURE — 6360000002 HC RX W HCPCS: Performed by: NURSE PRACTITIONER

## 2020-06-17 PROCEDURE — 96372 THER/PROPH/DIAG INJ SC/IM: CPT

## 2020-06-17 PROCEDURE — 70450 CT HEAD/BRAIN W/O DYE: CPT

## 2020-06-17 PROCEDURE — 72128 CT CHEST SPINE W/O DYE: CPT

## 2020-06-17 PROCEDURE — 99284 EMERGENCY DEPT VISIT MOD MDM: CPT

## 2020-06-17 PROCEDURE — 72125 CT NECK SPINE W/O DYE: CPT

## 2020-06-17 RX ORDER — HYDROCODONE BITARTRATE AND ACETAMINOPHEN 5; 325 MG/1; MG/1
1 TABLET ORAL EVERY 6 HOURS PRN
Qty: 12 TABLET | Refills: 0 | Status: SHIPPED | OUTPATIENT
Start: 2020-06-17 | End: 2020-06-20

## 2020-06-17 RX ORDER — HYDROMORPHONE HYDROCHLORIDE 1 MG/ML
1 INJECTION, SOLUTION INTRAMUSCULAR; INTRAVENOUS; SUBCUTANEOUS ONCE
Status: COMPLETED | OUTPATIENT
Start: 2020-06-17 | End: 2020-06-17

## 2020-06-17 RX ORDER — METHOCARBAMOL 500 MG/1
500 TABLET, FILM COATED ORAL 3 TIMES DAILY
Qty: 20 TABLET | Refills: 0 | Status: SHIPPED | OUTPATIENT
Start: 2020-06-17 | End: 2020-06-24

## 2020-06-17 RX ORDER — ORPHENADRINE CITRATE 30 MG/ML
60 INJECTION INTRAMUSCULAR; INTRAVENOUS ONCE
Status: COMPLETED | OUTPATIENT
Start: 2020-06-17 | End: 2020-06-17

## 2020-06-17 RX ADMIN — ORPHENADRINE CITRATE 60 MG: 30 INJECTION INTRAMUSCULAR; INTRAVENOUS at 13:32

## 2020-06-17 RX ADMIN — HYDROMORPHONE HYDROCHLORIDE 1 MG: 1 INJECTION, SOLUTION INTRAMUSCULAR; INTRAVENOUS; SUBCUTANEOUS at 13:32

## 2020-06-17 ASSESSMENT — PAIN DESCRIPTION - DESCRIPTORS: DESCRIPTORS: ACHING

## 2020-06-17 ASSESSMENT — PAIN DESCRIPTION - LOCATION: LOCATION: BACK;NECK

## 2020-06-17 ASSESSMENT — PAIN SCALES - GENERAL
PAINLEVEL_OUTOF10: 8
PAINLEVEL_OUTOF10: 4
PAINLEVEL_OUTOF10: 8

## 2020-06-17 ASSESSMENT — ENCOUNTER SYMPTOMS
SHORTNESS OF BREATH: 0
ABDOMINAL PAIN: 0
BACK PAIN: 1

## 2020-06-17 NOTE — ED NOTES
Bed: [facility]02  Expected date:   Expected time:   Means of arrival:   Comments:  pérez Vu RN  06/17/20 4114
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
## 2020-08-18 ENCOUNTER — HOSPITAL ENCOUNTER (EMERGENCY)
Age: 50
Discharge: HOME | End: 2020-08-18
Payer: MEDICAID

## 2020-08-18 VITALS
SYSTOLIC BLOOD PRESSURE: 124 MMHG | TEMPERATURE: 97.5 F | HEART RATE: 66 BPM | HEIGHT: 74 IN | RESPIRATION RATE: 17 BRPM | WEIGHT: 315 LBS | OXYGEN SATURATION: 95 % | BODY MASS INDEX: 40.43 KG/M2 | DIASTOLIC BLOOD PRESSURE: 82 MMHG

## 2020-08-18 PROCEDURE — 99282 EMERGENCY DEPT VISIT SF MDM: CPT

## 2020-08-18 PROCEDURE — 6360000002 HC RX W HCPCS: Performed by: NURSE PRACTITIONER

## 2020-08-18 PROCEDURE — 99283 EMERGENCY DEPT VISIT LOW MDM: CPT

## 2020-08-18 PROCEDURE — 96372 THER/PROPH/DIAG INJ SC/IM: CPT

## 2020-08-18 RX ORDER — MORPHINE SULFATE 4 MG/ML
4 INJECTION, SOLUTION INTRAMUSCULAR; INTRAVENOUS ONCE
Status: COMPLETED | OUTPATIENT
Start: 2020-08-18 | End: 2020-08-18

## 2020-08-18 RX ORDER — METHYLPREDNISOLONE SODIUM SUCCINATE 125 MG/2ML
125 INJECTION, POWDER, LYOPHILIZED, FOR SOLUTION INTRAMUSCULAR; INTRAVENOUS ONCE
Status: COMPLETED | OUTPATIENT
Start: 2020-08-18 | End: 2020-08-18

## 2020-08-18 RX ORDER — METHYLPREDNISOLONE 4 MG/1
TABLET ORAL
Qty: 1 KIT | Refills: 0 | Status: SHIPPED | OUTPATIENT
Start: 2020-08-18 | End: 2020-08-24

## 2020-08-18 RX ORDER — HYDROCODONE BITARTRATE AND ACETAMINOPHEN 10; 325 MG/1; MG/1
1 TABLET ORAL EVERY 6 HOURS PRN
Qty: 10 TABLET | Refills: 0 | Status: SHIPPED | OUTPATIENT
Start: 2020-08-18 | End: 2020-08-21

## 2020-08-18 RX ORDER — KETOROLAC TROMETHAMINE 30 MG/ML
30 INJECTION, SOLUTION INTRAMUSCULAR; INTRAVENOUS ONCE
Status: COMPLETED | OUTPATIENT
Start: 2020-08-18 | End: 2020-08-18

## 2020-08-18 RX ADMIN — METHYLPREDNISOLONE SODIUM SUCCINATE 125 MG: 125 INJECTION, POWDER, FOR SOLUTION INTRAMUSCULAR; INTRAVENOUS at 10:37

## 2020-08-18 RX ADMIN — KETOROLAC TROMETHAMINE 30 MG: 30 INJECTION, SOLUTION INTRAMUSCULAR at 10:37

## 2020-08-18 RX ADMIN — MORPHINE SULFATE 4 MG: 4 INJECTION, SOLUTION INTRAMUSCULAR; INTRAVENOUS at 10:36

## 2020-08-18 ASSESSMENT — PAIN SCALES - GENERAL: PAINLEVEL_OUTOF10: 10

## 2020-08-18 NOTE — ED PROVIDER NOTES
[facility]  eMERGENCY dEPARTMENT eNCOUnter      Pt Name: Rosalinda Butler  MRN: [id number]  [place] 1970  Date of evaluation: 8/18/2020  Provider: Mackenzie Lau, [address]       Chief Complaint   Patient presents with    Gout     left foot          HISTORY OF PRESENT ILLNESS   (Location/Symptom, Timing/Onset,Context/Setting, Quality, Duration, Modifying Factors, Severity)  Note limiting factors. Ora Brooks a 52 y.o. male who presents to the emergency department for evaluation of left foot pain. Pt tells me that he has had left foot pain similar to previous experience with gout. He relates that he has had pain along left ankle and left great toe over past 3 days. He denies injury to his left foot/ankle. He relates that he did have injury to his left lower leg showing me healing abrasions 2 weeks ago. He tells me that his left foot didn't start hurting unit 3 days ago. He has history of gouty arthritis. He tells me that he has had no redness or swelling of left foot however that is typical for gouty arthritis attacks. He has had no fevers or recent illness. [facility]    Nursing Notes were reviewed. REVIEW OF SYSTEMS    (2-9 systems for level 4, 10 or more for level 5)     Review of Systems   Constitutional: Negative. Musculoskeletal: Positive for arthralgias (left ankle). A complete review of systems was performed and is negative except as noted above in the HPI.        PAST MEDICAL HISTORY     Past Medical History:   Diagnosis Date    Arthritis     Depression     Hypertension     Vitamin D deficiency          SURGICAL HISTORY       Past Surgical History:   Procedure Laterality Date    APPENDECTOMY      JOINT REPLACEMENT Left     hip         CURRENT MEDICATIONS       Previous Medications    MELOXICAM (MOBIC) 15 MG TABLET    Take 15 mg by mouth daily    PANTOPRAZOLE (PROTONIX) 40 MG TABLET    Take 1 tablet by mouth daily       ALLERGIES     Patient has no known allergies. FAMILY HISTORY     No family history on file. SOCIAL HISTORY       Social History     Socioeconomic History    Marital status: Single     Spouse name: Not on file    Number of children: Not on file    Years of education: Not on file    Highest education level: Not on file   Occupational History    Not on file   Social Needs    Financial resource strain: Not on file    Food insecurity     Worry: Not on file     Inability: Not on file    Transportation needs     Medical: Not on file     Non-medical: Not on file   Tobacco Use    Smoking status: Never Smoker    Smokeless tobacco: Never Used   Substance and Sexual Activity    Alcohol use:  Yes     Alcohol/week: 7.0 standard drinks     Types: 7 Cans of beer per week     Comment: 1 beer a night    Drug use: No    Sexual activity: Not on file   Lifestyle    Physical activity     Days per week: Not on file     Minutes per session: Not on file    Stress: Not on file   Relationships    Social connections     Talks on phone: Not on file     Gets together: Not on file     Attends Sikhism service: Not on file     Active member of club or organization: Not on file     Attends meetings of clubs or organizations: Not on file     Relationship status: Not on file    Intimate partner violence     Fear of current or ex partner: Not on file     Emotionally abused: Not on file     Physically abused: Not on file     Forced sexual activity: Not on file   Other Topics Concern    Not on file   Social History Narrative    Not on file       SCREENINGS    Jun Coma Scale  Eye Opening: Spontaneous  Best Verbal Response: Oriented  Best Motor Response: Obeys commands  [place] Coma Scale Score: 15        PHYSICAL EXAM    (up to 7 for level 4, 8 or more for level 5)     ED Triage Vitals [08/18/20 0954]   BP Temp Temp Source Pulse Resp SpO2 Height Weight   129/88 97.5 °F (36.4 °C) Oral 67 16 94 % 6' 2\" (1.88 m) (!) 315 lb (142.9 kg)       Physical Exam  Vitals signs reviewed. Cardiovascular:      Rate and Rhythm: Normal rate. Pulmonary:      Effort: Pulmonary effort is normal.   Musculoskeletal:      Comments: Left anterior leg with healing abrasion wounds without secondary sign of infection  Normal dorsiflexion/plantar flexion of left ankle/foot  No obvious redness or swelling of left foot  Mild ttp left ankle  CMS intact left lower extremity    Skin:     General: Skin is warm and dry. Neurological:      Mental Status: He is alert and oriented to person, place, and time. DIAGNOSTIC RESULTS     EKG: All EKG's are interpreted by the Emergency Department Physician who either signs or Co-signs this chart in the absence of acardiologist.        RADIOLOGY:   Non-plain film images such as CT, Ultrasound andMRI are read by the radiologist. Plain radiographic images are visualized and preliminarily interpreted by the emergency physician with the below findings:        Interpretation per the Radiologist below, if available at the time of this note:    No orders to display         ED BEDSIDE ULTRASOUND:   Performed by ED Physician - none    LABS:  Labs Reviewed - No data to display    All other labs were within normal range or not returned as of this dictation. RE-ASSESSMENT           EMERGENCY DEPARTMENT COURSE and DIFFERENTIALDIAGNOSIS/MDM:   Vitals:    Vitals:    08/18/20 0954 08/18/20 1100   BP: 129/88 124/82   Pulse: 67 66   Resp: 16 17   Temp: 97.5 °F (36.4 °C)    TempSrc: Oral    SpO2: 94% 95%   Weight: (!) 315 lb (142.9 kg)    Height: 6' 2\" (1.88 m)        MDM      CONSULTS:  None    PROCEDURES:  Unless otherwise notedbelow, none     Procedures    FINAL IMPRESSION     1. Acute left ankle pain    2.  Gouty arthritis          DISPOSITION/PLAN   DISPOSITION Decision To Discharge 08/18/2020 11:03:01 AM      [facility]  [facility]  [phone]    Schedule an appointment as soon as possible for a visit         DISCHARGE MEDICATIONS:    Attestation: The Prescription Monitoring Report for this patient was reviewed today. ([id number] ) Gonsalez Standard, APRN)  Periodic Controlled Substance Monitoring: Possible medication side effects, risk of tolerance/dependence & alternative treatments discussed., No signs of potential drug abuse or diversion identified. Gonsalez Standard, APRN)  Current Discharge Medication List           Medication List      START taking these medications    HYDROcodone-acetaminophen  MG per tablet  Commonly known as:  Norco  Take 1 tablet by mouth every 6 hours as needed for Pain for up to 3 days. Intended supply: 3 days     methylPREDNISolone 4 MG tablet  Commonly known as:  MEDROL (DORINDA)  Take by mouth as directed.         ASK your doctor about these medications    meloxicam 15 MG tablet  Commonly known as:  MOBIC     pantoprazole 40 MG tablet  Commonly known as:  Protonix  Take 1 tablet by mouth daily           Where to Get Your Medications      You can get these medications from any pharmacy    Bring a paper prescription for each of these medications  · HYDROcodone-acetaminophen  MG per tablet  · methylPREDNISolone 4 MG tablet           (Pleasenote that portions of this note were completed with a voice recognition program.  Efforts were made to edit the dictations but occasionally words are mis-transcribed.)              Gonsalez Standard, APRN  08/18/20 [id number]

## 2021-02-18 ENCOUNTER — HOSPITAL ENCOUNTER (EMERGENCY)
Age: 51
Discharge: HOME | End: 2021-02-18
Payer: MEDICAID

## 2021-02-18 VITALS
DIASTOLIC BLOOD PRESSURE: 94 MMHG | HEART RATE: 103 BPM | BODY MASS INDEX: 40.43 KG/M2 | SYSTOLIC BLOOD PRESSURE: 140 MMHG | TEMPERATURE: 97.7 F | WEIGHT: 315 LBS | RESPIRATION RATE: 17 BRPM | HEIGHT: 74 IN | OXYGEN SATURATION: 96 %

## 2021-02-18 DIAGNOSIS — M10.071 ACUTE IDIOPATHIC GOUT OF RIGHT FOOT: Primary | ICD-10-CM

## 2021-02-18 PROCEDURE — 99283 EMERGENCY DEPT VISIT LOW MDM: CPT

## 2021-02-18 PROCEDURE — 96375 TX/PRO/DX INJ NEW DRUG ADDON: CPT

## 2021-02-18 PROCEDURE — 6360000002 HC RX W HCPCS: Performed by: PHYSICIAN ASSISTANT

## 2021-02-18 PROCEDURE — 6370000000 HC RX 637 (ALT 250 FOR IP): Performed by: PHYSICIAN ASSISTANT

## 2021-02-18 PROCEDURE — 96374 THER/PROPH/DIAG INJ IV PUSH: CPT

## 2021-02-18 RX ORDER — PREDNISONE 10 MG/1
30 TABLET ORAL DAILY
Qty: 30 TABLET | Refills: 0 | Status: SHIPPED | OUTPATIENT
Start: 2021-02-18 | End: 2021-02-28

## 2021-02-18 RX ORDER — DEXAMETHASONE SODIUM PHOSPHATE 10 MG/ML
4 INJECTION, SOLUTION INTRAMUSCULAR; INTRAVENOUS ONCE
Status: DISCONTINUED | OUTPATIENT
Start: 2021-02-18 | End: 2021-02-18

## 2021-02-18 RX ORDER — METHYLPREDNISOLONE SODIUM SUCCINATE 125 MG/2ML
125 INJECTION, POWDER, LYOPHILIZED, FOR SOLUTION INTRAMUSCULAR; INTRAVENOUS ONCE
Status: COMPLETED | OUTPATIENT
Start: 2021-02-18 | End: 2021-02-18

## 2021-02-18 RX ORDER — KETOROLAC TROMETHAMINE 30 MG/ML
30 INJECTION, SOLUTION INTRAMUSCULAR; INTRAVENOUS ONCE
Status: DISCONTINUED | OUTPATIENT
Start: 2021-02-18 | End: 2021-02-18

## 2021-02-18 RX ORDER — KETOROLAC TROMETHAMINE 30 MG/ML
30 INJECTION, SOLUTION INTRAMUSCULAR; INTRAVENOUS ONCE
Status: COMPLETED | OUTPATIENT
Start: 2021-02-18 | End: 2021-02-18

## 2021-02-18 RX ORDER — COLCHICINE 0.6 MG/1
1.2 TABLET ORAL DAILY
Status: DISCONTINUED | OUTPATIENT
Start: 2021-02-18 | End: 2021-02-18 | Stop reason: HOSPADM

## 2021-02-18 RX ORDER — MORPHINE SULFATE 4 MG/ML
4 INJECTION, SOLUTION INTRAMUSCULAR; INTRAVENOUS ONCE
Status: DISCONTINUED | OUTPATIENT
Start: 2021-02-18 | End: 2021-02-18 | Stop reason: HOSPADM

## 2021-02-18 RX ORDER — METHYLPREDNISOLONE SODIUM SUCCINATE 125 MG/2ML
125 INJECTION, POWDER, LYOPHILIZED, FOR SOLUTION INTRAMUSCULAR; INTRAVENOUS ONCE
Status: DISCONTINUED | OUTPATIENT
Start: 2021-02-18 | End: 2021-02-18

## 2021-02-18 RX ADMIN — METHYLPREDNISOLONE SODIUM SUCCINATE 125 MG: 125 INJECTION, POWDER, FOR SOLUTION INTRAMUSCULAR; INTRAVENOUS at 21:01

## 2021-02-18 RX ADMIN — COLCHICINE 1.2 MG: 0.6 TABLET, FILM COATED ORAL at 21:18

## 2021-02-18 RX ADMIN — KETOROLAC TROMETHAMINE 30 MG: 30 INJECTION, SOLUTION INTRAMUSCULAR at 21:02

## 2021-02-18 ASSESSMENT — ENCOUNTER SYMPTOMS
BACK PAIN: 0
PHOTOPHOBIA: 0
EYE ITCHING: 0
COUGH: 0
COLOR CHANGE: 0
APNEA: 0
EYE DISCHARGE: 0
SHORTNESS OF BREATH: 0

## 2021-02-18 ASSESSMENT — PAIN SCALES - GENERAL
PAINLEVEL_OUTOF10: 10
PAINLEVEL_OUTOF10: 10

## 2021-02-18 ASSESSMENT — PAIN DESCRIPTION - LOCATION: LOCATION: FOOT

## 2021-02-19 NOTE — ED PROVIDER NOTES
[facility] EMERGENCY DEPT  eMERGENCYdEPARTMENT eNCOUnter      Pt Name: Raquel Reynolds  MRN: [id number]  [place] 1970  Date of evaluation: 2/18/2021  Provider:DARRELL Penaloza    CHIEF COMPLAINT       Chief Complaint   Patient presents with    Gout     right foot         HISTORY OF PRESENT ILLNESS  (Location/Symptom, Timing/Onset, Context/Setting, Quality, Duration, Modifying Factors, Severity.)   Raquel Reynolds is a 48 y.o. male who presents to the emergency department with complaints of gout flare up within past 24 hrs of R first MTP. He states this feels similar to prior flareups denies any injury he is -[place] with some obvious discoloration to his first MTP joint right foot no erythema no fever or chills. Requesting \"what they normally give me before. \"  His pain is 10 out of 10 with no referred. He tells me that he takes prophylaxis/baseline gouty flare medication I do not see it listed? HPI    Nursing Notes were reviewed and I agree. REVIEW OF SYSTEMS    (2-9 systems for level 4, 10 or more for level 5)     Review of Systems   Constitutional: Negative for activity change, appetite change, chills and fever. HENT: Negative for congestion and dental problem. Eyes: Negative for photophobia, discharge and itching. Respiratory: Negative for apnea, cough and shortness of breath. Cardiovascular: Negative for chest pain. Musculoskeletal: Positive for arthralgias and joint swelling. Negative for back pain, gait problem, myalgias and neck pain. Skin: Negative for color change, pallor and rash. Neurological: Negative for dizziness, seizures and syncope. Psychiatric/Behavioral: Negative for agitation. The patient is not nervous/anxious. Except as noted above the remainder of the review of systems was reviewed and negative.        PAST MEDICAL HISTORY     Past Medical History:   Diagnosis Date    Arthritis     Depression     Gout     Hypertension     Vitamin D deficiency SURGICAL HISTORY       Past Surgical History:   Procedure Laterality Date    APPENDECTOMY      JOINT REPLACEMENT Left     hip         CURRENT MEDICATIONS       Previous Medications    MELOXICAM (MOBIC) 15 MG TABLET    Take 15 mg by mouth daily    PANTOPRAZOLE (PROTONIX) 40 MG TABLET    Take 1 tablet by mouth daily       ALLERGIES     Patient has no known allergies. FAMILY HISTORY     History reviewed. No pertinent family history. SOCIAL HISTORY       Social History     Socioeconomic History    Marital status: Single     Spouse name: None    Number of children: None    Years of education: None    Highest education level: None   Occupational History    None   Social Needs    Financial resource strain: None    Food insecurity     Worry: None     Inability: None    Transportation needs     Medical: None     Non-medical: None   Tobacco Use    Smoking status: Former Smoker    Smokeless tobacco: Never Used   Substance and Sexual Activity    Alcohol use:  Yes     Alcohol/week: 7.0 standard drinks     Types: 7 Cans of beer per week     Comment: occasional    Drug use: No    Sexual activity: None   Lifestyle    Physical activity     Days per week: None     Minutes per session: None    Stress: None   Relationships    Social connections     Talks on phone: None     Gets together: None     Attends Pentecostal service: None     Active member of club or organization: None     Attends meetings of clubs or organizations: None     Relationship status: None    Intimate partner violence     Fear of current or ex partner: None     Emotionally abused: None     Physically abused: None     Forced sexual activity: None   Other Topics Concern    None   Social History Narrative    None       SCREENINGS           PHYSICAL EXAM    (up to 7 forlevel 4, 8 or more for level 5)     ED Triage Vitals [02/18/21 1944]   BP Temp Temp src Pulse Resp SpO2 Height Weight   (!) 140/94 97.7 °F (36.5 °C) -- 103 17 96 % 6' 2\" Reviewed - No data to display    All other labs were within normal range or notreturned as of this dictation. RE-ASSESSMENT        EMERGENCY DEPARTMENT COURSE and DIFFERENTIAL DIAGNOSIS/MDM:   Vitals:    Vitals:    02/18/21 1944   BP: (!) 140/94   Pulse: 103   Resp: 17   Temp: 97.7 °F (36.5 °C)   SpO2: 96%   Weight: (!) 340 lb (154.2 kg)   Height: 6' 2\" (1.88 m)       MDM  Patient is feeling better after given some IV pain medicine here along with oral colchicine he will go home with a high-dose prednisone I like him to take for minimum of 7 to 10 days and taper I have also given him a uric acid diet to look over and follow-up closely with PCP. PROCEDURES:    Procedures      FINAL IMPRESSION      1.  Acute idiopathic gout of right foot          DISPOSITION/PLAN   DISPOSITION Discharge - Pending Orders Complete 02/18/2021 08:38:06 PM      PATIENT REFERRED TO:  [facility] EMERGENCY DE[facility]  Mikey Paulino  [phone]    If symptoms worsen    DO Flor Bryson  [phone]    Call in 3 days        DISCHARGE MEDICATIONS:  New Prescriptions    PREDNISONE (DELTASONE) 10 MG TABLET    Take 3 tablets by mouth daily for 10 days       (Please note that portions of this note were completed with a voice recognition program.  Efforts were made to edit the dictations but occasionallywords are mis-transcribed.)    Jorge Soliman [address] [place] [address]  02/18/21 3584

## 2021-04-01 ENCOUNTER — APPOINTMENT (OUTPATIENT)
Dept: GENERAL RADIOLOGY | Age: 51
End: 2021-04-01
Payer: MEDICAID

## 2021-04-01 ENCOUNTER — HOSPITAL ENCOUNTER (EMERGENCY)
Age: 51
Discharge: HOME | End: 2021-04-01
Attending: EMERGENCY MEDICINE
Payer: MEDICAID

## 2021-04-01 VITALS
DIASTOLIC BLOOD PRESSURE: 75 MMHG | SYSTOLIC BLOOD PRESSURE: 129 MMHG | TEMPERATURE: 98.2 F | RESPIRATION RATE: 18 BRPM | BODY MASS INDEX: 43.01 KG/M2 | HEART RATE: 75 BPM | WEIGHT: 315 LBS | OXYGEN SATURATION: 98 %

## 2021-04-01 DIAGNOSIS — M25.512 ACUTE PAIN OF LEFT SHOULDER: Primary | ICD-10-CM

## 2021-04-01 DIAGNOSIS — Z87.39 HISTORY OF GOUT: ICD-10-CM

## 2021-04-01 DIAGNOSIS — M79.671 RIGHT FOOT PAIN: ICD-10-CM

## 2021-04-01 LAB
ALBUMIN SERPL-MCNC: 4.7 G/DL (ref 3.5–5.2)
ALP BLD-CCNC: 60 U/L (ref 40–130)
ALT SERPL-CCNC: 27 U/L (ref 5–41)
ANION GAP SERPL CALCULATED.3IONS-SCNC: 12 MMOL/L (ref 7–19)
AST SERPL-CCNC: 23 U/L (ref 5–40)
BASOPHILS ABSOLUTE: 0.1 K/UL (ref 0–0.2)
BASOPHILS RELATIVE PERCENT: 0.5 % (ref 0–1)
BILIRUB SERPL-MCNC: 0.4 MG/DL (ref 0.2–1.2)
BUN BLDV-MCNC: 15 MG/DL (ref 6–20)
CALCIUM SERPL-MCNC: 10 MG/DL (ref 8.6–10)
CHLORIDE BLD-SCNC: 99 MMOL/L (ref 98–111)
CO2: 26 MMOL/L (ref 22–29)
CREAT SERPL-MCNC: 1 MG/DL (ref 0.5–1.2)
EOSINOPHILS ABSOLUTE: 0 K/UL (ref 0–0.6)
EOSINOPHILS RELATIVE PERCENT: 0.1 % (ref 0–5)
GFR AFRICAN AMERICAN: >59
GFR NON-AFRICAN AMERICAN: >60
GLUCOSE BLD-MCNC: 103 MG/DL (ref 74–109)
HCT VFR BLD CALC: 41.9 % (ref 42–52)
HEMOGLOBIN: 12.6 G/DL (ref 14–18)
IMMATURE GRANULOCYTES #: 0.2 K/UL
LYMPHOCYTES ABSOLUTE: 0.9 K/UL (ref 1.1–4.5)
LYMPHOCYTES RELATIVE PERCENT: 8.5 % (ref 20–40)
MCH RBC QN AUTO: 27.8 PG (ref 27–31)
MCHC RBC AUTO-ENTMCNC: 30.1 G/DL (ref 33–37)
MCV RBC AUTO: 92.5 FL (ref 80–94)
MONOCYTES ABSOLUTE: 0.3 K/UL (ref 0–0.9)
MONOCYTES RELATIVE PERCENT: 2.3 % (ref 0–10)
NEUTROPHILS ABSOLUTE: 9.4 K/UL (ref 1.5–7.5)
NEUTROPHILS RELATIVE PERCENT: 87.2 % (ref 50–65)
PDW BLD-RTO: 12.9 % (ref 11.5–14.5)
PLATELET # BLD: 182 K/UL (ref 130–400)
PMV BLD AUTO: 11.7 FL (ref 9.4–12.4)
POTASSIUM REFLEX MAGNESIUM: 4.1 MMOL/L (ref 3.5–5)
RBC # BLD: 4.53 M/UL (ref 4.7–6.1)
SODIUM BLD-SCNC: 137 MMOL/L (ref 136–145)
TOTAL PROTEIN: 7.5 G/DL (ref 6.6–8.7)
URIC ACID, SERUM: 8.2 MG/DL (ref 3.4–7)
WBC # BLD: 10.7 K/UL (ref 4.8–10.8)

## 2021-04-01 PROCEDURE — 96374 THER/PROPH/DIAG INJ IV PUSH: CPT

## 2021-04-01 PROCEDURE — 6360000002 HC RX W HCPCS: Performed by: EMERGENCY MEDICINE

## 2021-04-01 PROCEDURE — 85025 COMPLETE CBC W/AUTO DIFF WBC: CPT

## 2021-04-01 PROCEDURE — 73030 X-RAY EXAM OF SHOULDER: CPT

## 2021-04-01 PROCEDURE — 99283 EMERGENCY DEPT VISIT LOW MDM: CPT

## 2021-04-01 PROCEDURE — 84550 ASSAY OF BLOOD/URIC ACID: CPT

## 2021-04-01 PROCEDURE — 73630 X-RAY EXAM OF FOOT: CPT

## 2021-04-01 PROCEDURE — 80053 COMPREHEN METABOLIC PANEL: CPT

## 2021-04-01 PROCEDURE — 36415 COLL VENOUS BLD VENIPUNCTURE: CPT

## 2021-04-01 RX ORDER — DEXAMETHASONE SODIUM PHOSPHATE 10 MG/ML
10 INJECTION, SOLUTION INTRAMUSCULAR; INTRAVENOUS ONCE
Status: COMPLETED | OUTPATIENT
Start: 2021-04-01 | End: 2021-04-01

## 2021-04-01 RX ORDER — HYDROMORPHONE HYDROCHLORIDE 1 MG/ML
1 INJECTION, SOLUTION INTRAMUSCULAR; INTRAVENOUS; SUBCUTANEOUS EVERY 30 MIN PRN
Status: DISCONTINUED | OUTPATIENT
Start: 2021-04-01 | End: 2021-04-02 | Stop reason: HOSPADM

## 2021-04-01 RX ORDER — OXYCODONE AND ACETAMINOPHEN 7.5; 325 MG/1; MG/1
1 TABLET ORAL EVERY 6 HOURS PRN
Qty: 12 TABLET | Refills: 0 | Status: SHIPPED | OUTPATIENT
Start: 2021-04-01 | End: 2021-04-04

## 2021-04-01 RX ORDER — ONDANSETRON 2 MG/ML
4 INJECTION INTRAMUSCULAR; INTRAVENOUS ONCE
Status: COMPLETED | OUTPATIENT
Start: 2021-04-01 | End: 2021-04-01

## 2021-04-01 RX ORDER — PREDNISONE 10 MG/1
TABLET ORAL
Qty: 21 EACH | Refills: 0 | Status: SHIPPED | OUTPATIENT
Start: 2021-04-01

## 2021-04-01 RX ADMIN — DEXAMETHASONE SODIUM PHOSPHATE 10 MG: 10 INJECTION, SOLUTION INTRAMUSCULAR; INTRAVENOUS at 20:50

## 2021-04-01 RX ADMIN — HYDROMORPHONE HYDROCHLORIDE 1 MG: 1 INJECTION, SOLUTION INTRAMUSCULAR; INTRAVENOUS; SUBCUTANEOUS at 20:50

## 2021-04-01 RX ADMIN — ONDANSETRON HYDROCHLORIDE 4 MG: 2 SOLUTION INTRAMUSCULAR; INTRAVENOUS at 20:50

## 2021-04-01 ASSESSMENT — ENCOUNTER SYMPTOMS
NAUSEA: 0
SINUS PRESSURE: 0
FACIAL SWELLING: 0
SHORTNESS OF BREATH: 0
CONSTIPATION: 0
APNEA: 0
EYE DISCHARGE: 0
VOICE CHANGE: 0
SORE THROAT: 0
CHOKING: 0
DIARRHEA: 0
BLOOD IN STOOL: 0

## 2021-04-01 ASSESSMENT — PAIN DESCRIPTION - DESCRIPTORS: DESCRIPTORS: ACHING

## 2021-04-01 ASSESSMENT — PAIN DESCRIPTION - PAIN TYPE: TYPE: ACUTE PAIN

## 2021-04-01 ASSESSMENT — PAIN DESCRIPTION - LOCATION: LOCATION: ANKLE

## 2021-04-02 NOTE — ED PROVIDER NOTES
[facility] EMERGENCY DEPT  eMERGENCY dEPARTMENT eNCOUnter      Pt Name: Kevan Bazzi  MRN: [id number]  [place] 1970  Date of evaluation: 4/1/2021  Provider: Rodrigue Gonzales MD    [address] [place] [address]       Chief Complaint   Patient presents with    Shoulder Pain    Ankle Pain         HISTORY OF PRESENT ILLNESS   (Location/Symptom, Timing/Onset,Context/Setting, Quality, Duration, Modifying Factors, Severity)  Note limiting factors. Kevan Bazzi is a 48 y.o. male who presents to the emergency department evaluation of left shoulder and right foot pain. 51-year-old male presents with complaints of at least 2 days of left shoulder pain point where he could not sleep. And right foot pain. He denies any known injury no point of injury or trauma. He has a history of gout states he takes colchicine daily. Does not know what allopurinol is at least in our discussion. He denies any recent infections fever or chills. The history is provided by the patient. NursingNotes were reviewed. REVIEW OF SYSTEMS    (2-9 systems for level 4, 10 or more for level 5)     Review of Systems   Constitutional: Negative for chills and fever. HENT: Negative for congestion, drooling, facial swelling, nosebleeds, sinus pressure, sore throat and voice change. Eyes: Negative for discharge. Respiratory: Negative for apnea, choking and shortness of breath. Cardiovascular: Negative for chest pain and leg swelling. Gastrointestinal: Negative for blood in stool, constipation, diarrhea and nausea. Genitourinary: Negative for dysuria and enuresis. Musculoskeletal: Negative for joint swelling. Left shoulder and right foot pain   Skin: Negative for rash and wound. Neurological: Negative for seizures and syncope. Psychiatric/Behavioral: Negative for behavioral problems, hallucinations and suicidal ideas. All other systems reviewed and are negative.       A complete review of systems was performed and is negative except as noted above in the HPI. PAST MEDICAL HISTORY     Past Medical History:   Diagnosis Date    Arthritis     Depression     Gout     Hypertension     Vitamin D deficiency          SURGICAL HISTORY       Past Surgical History:   Procedure Laterality Date    APPENDECTOMY      JOINT REPLACEMENT Left     hip         CURRENT MEDICATIONS       Discharge Medication List as of 4/1/2021 10:03 PM      CONTINUE these medications which have NOT CHANGED    Details   meloxicam (MOBIC) 15 MG tablet Take 15 mg by mouth dailyHistorical Med      pantoprazole (PROTONIX) 40 MG tablet Take 1 tablet by mouth daily, Disp-30 tablet, R-1Print             ALLERGIES     Patient has no known allergies. FAMILY HISTORY     History reviewed. No pertinent family history. SOCIAL HISTORY       Social History     Socioeconomic History    Marital status: Single     Spouse name: None    Number of children: None    Years of education: None    Highest education level: None   Occupational History    None   Social Needs    Financial resource strain: None    Food insecurity     Worry: None     Inability: None    Transportation needs     Medical: None     Non-medical: None   Tobacco Use    Smoking status: Former Smoker    Smokeless tobacco: Never Used   Substance and Sexual Activity    Alcohol use:  Yes     Alcohol/week: 7.0 standard drinks     Types: 7 Cans of beer per week     Comment: occasional    Drug use: No    Sexual activity: None   Lifestyle    Physical activity     Days per week: None     Minutes per session: None    Stress: None   Relationships    Social connections     Talks on phone: None     Gets together: None     Attends Mosque service: None     Active member of club or organization: None     Attends meetings of clubs or organizations: None     Relationship status: None    Intimate partner violence     Fear of current or ex partner: None     Emotionally abused: None     Physically abused: None Forced sexual activity: None   Other Topics Concern    None   Social History Narrative    None       SCREENINGS             PHYSICAL EXAM    (up to 7 for level 4, 8 or more for level 5)     ED Triage Vitals   BP Temp Temp Source Pulse Resp SpO2 Height Weight   04/01/21 1734 04/01/21 1731 04/01/21 1731 04/01/21 1731 04/01/21 1731 04/01/21 1731 -- 04/01/21 1731   128/76 97.3 °F (36.3 °C) Temporal 93 18 92 %  (!) 335 lb (152 kg)       Physical Exam  Vitals signs and nursing note reviewed. Constitutional:       General: He is not in acute distress. Appearance: He is well-developed. HENT:      Head: Normocephalic and atraumatic. Right Ear: External ear normal.      Left Ear: External ear normal.   Eyes:      General: No scleral icterus. Conjunctiva/sclera: Conjunctivae normal.      Pupils: Pupils are equal, round, and reactive to light. Neck:      Musculoskeletal: Normal range of motion and neck supple. Cardiovascular:      Rate and Rhythm: Normal rate and regular rhythm. Pulses: Normal pulses. Heart sounds: Normal heart sounds. No murmur. Pulmonary:      Effort: Pulmonary effort is normal. No respiratory distress. Breath sounds: Normal breath sounds. Abdominal:      General: Bowel sounds are normal.      Palpations: Abdomen is soft. Musculoskeletal: Normal range of motion. Arms:         Feet:    Skin:     General: Skin is warm and dry. Neurological:      General: No focal deficit present. Mental Status: He is alert and oriented to person, place, and time.    Psychiatric:         Mood and Affect: Mood normal.         Behavior: Behavior normal.         DIAGNOSTIC RESULTS     EKG: All EKG's are interpreted by the Emergency Department Physician who either signs or Co-signs this chart in the absence of a cardiologist.        RADIOLOGY:   Non-plain film images such as CT, Ultrasound and MRI are read by the radiologist. Plainradiographic images are visualized and Uric acid level slightly elevated better than before. X-ray nothing acute but there appears to be some calcification in the rotator cuff tendon on the left which might indicate some chronic inflammation. Right now he is just overuse syndrome I has some bursitis in his shoulder. May be gout acute flare in the foot but not classic. He got relief with pain medication and was thankful. We will do some pain medication for a few days and a steroid Dosepak. I asked him to follow-up with his clinician. And if his shoulder becomes recurrent or continuous problem may be orthopedic follow-up. CONSULTS:  None    PROCEDURES:  Unless otherwise notedbelow, none     Procedures    FINAL IMPRESSION     1. Acute pain of left shoulder    2. Right foot pain    3. History of gout          DISPOSITION/PLAN   DISPOSITION Decision To Discharge 04/01/2021 09:56:29 PM      PATIENT REFERRED TO:  @FUP@    DISCHARGE MEDICATIONS:  Discharge Medication List as of 4/1/2021 10:03 PM      START taking these medications    Details   oxyCODONE-acetaminophen (PERCOCET) 7.5-325 MG per tablet Take 1 tablet by mouth every 6 hours as needed for Pain for up to 3 days. , Disp-12 tablet, R-0Normal      predniSONE 10 MG (21) TBPK Follow label directions. , Disp-21 each, R-0Normal                (Please note that portions of this note were completed with a voice recognition program.  Efforts were made to edit the dictations butoccasionally words are mis-transcribed.)    Amanda Dan MD (electronically signed)  AttendingEmergency Physician          Rigo Stahl MD  04/01/21 2949

## 2021-06-19 ENCOUNTER — HOSPITAL ENCOUNTER (EMERGENCY)
Age: 51
Discharge: HOME | End: 2021-06-19
Attending: EMERGENCY MEDICINE
Payer: MEDICAID

## 2021-06-19 ENCOUNTER — APPOINTMENT (OUTPATIENT)
Dept: GENERAL RADIOLOGY | Age: 51
End: 2021-06-19
Payer: MEDICAID

## 2021-06-19 VITALS
HEART RATE: 60 BPM | OXYGEN SATURATION: 95 % | TEMPERATURE: 98.8 F | SYSTOLIC BLOOD PRESSURE: 138 MMHG | DIASTOLIC BLOOD PRESSURE: 86 MMHG | RESPIRATION RATE: 13 BRPM

## 2021-06-19 DIAGNOSIS — R07.9 CHEST PAIN, UNSPECIFIED TYPE: Primary | ICD-10-CM

## 2021-06-19 LAB
ALBUMIN SERPL-MCNC: 4.1 G/DL (ref 3.5–5.2)
ALP BLD-CCNC: 67 U/L (ref 40–130)
ALT SERPL-CCNC: 23 U/L (ref 5–41)
ANION GAP SERPL CALCULATED.3IONS-SCNC: 7 MMOL/L (ref 7–19)
AST SERPL-CCNC: 20 U/L (ref 5–40)
BASOPHILS ABSOLUTE: 0.1 K/UL (ref 0–0.2)
BASOPHILS RELATIVE PERCENT: 0.8 % (ref 0–1)
BILIRUB SERPL-MCNC: 0.3 MG/DL (ref 0.2–1.2)
BUN BLDV-MCNC: 16 MG/DL (ref 6–20)
CALCIUM SERPL-MCNC: 9.3 MG/DL (ref 8.6–10)
CHLORIDE BLD-SCNC: 105 MMOL/L (ref 98–111)
CO2: 33 MMOL/L (ref 22–29)
CREAT SERPL-MCNC: 1.2 MG/DL (ref 0.5–1.2)
EOSINOPHILS ABSOLUTE: 0.1 K/UL (ref 0–0.6)
EOSINOPHILS RELATIVE PERCENT: 1.4 % (ref 0–5)
GFR AFRICAN AMERICAN: >59
GFR NON-AFRICAN AMERICAN: >60
GLUCOSE BLD-MCNC: 90 MG/DL (ref 74–109)
HCT VFR BLD CALC: 38.9 % (ref 42–52)
HEMOGLOBIN: 12 G/DL (ref 14–18)
IMMATURE GRANULOCYTES #: 0.3 K/UL
LYMPHOCYTES ABSOLUTE: 2.5 K/UL (ref 1.1–4.5)
LYMPHOCYTES RELATIVE PERCENT: 24.6 % (ref 20–40)
MCH RBC QN AUTO: 28.7 PG (ref 27–31)
MCHC RBC AUTO-ENTMCNC: 30.8 G/DL (ref 33–37)
MCV RBC AUTO: 93.1 FL (ref 80–94)
MONOCYTES ABSOLUTE: 0.9 K/UL (ref 0–0.9)
MONOCYTES RELATIVE PERCENT: 9.1 % (ref 0–10)
NEUTROPHILS ABSOLUTE: 6.1 K/UL (ref 1.5–7.5)
NEUTROPHILS RELATIVE PERCENT: 61.5 % (ref 50–65)
PDW BLD-RTO: 13.1 % (ref 11.5–14.5)
PLATELET # BLD: 235 K/UL (ref 130–400)
PMV BLD AUTO: 11.1 FL (ref 9.4–12.4)
POTASSIUM SERPL-SCNC: 3.7 MMOL/L (ref 3.5–5)
RBC # BLD: 4.18 M/UL (ref 4.7–6.1)
SODIUM BLD-SCNC: 145 MMOL/L (ref 136–145)
TOTAL PROTEIN: 6.7 G/DL (ref 6.6–8.7)
TROPONIN: <0.01 NG/ML (ref 0–0.03)
WBC # BLD: 10 K/UL (ref 4.8–10.8)

## 2021-06-19 PROCEDURE — 85025 COMPLETE CBC W/AUTO DIFF WBC: CPT

## 2021-06-19 PROCEDURE — 84484 ASSAY OF TROPONIN QUANT: CPT

## 2021-06-19 PROCEDURE — 80053 COMPREHEN METABOLIC PANEL: CPT

## 2021-06-19 PROCEDURE — 99284 EMERGENCY DEPT VISIT MOD MDM: CPT

## 2021-06-19 PROCEDURE — 6360000002 HC RX W HCPCS: Performed by: EMERGENCY MEDICINE

## 2021-06-19 PROCEDURE — 6370000000 HC RX 637 (ALT 250 FOR IP): Performed by: EMERGENCY MEDICINE

## 2021-06-19 PROCEDURE — 71045 X-RAY EXAM CHEST 1 VIEW: CPT

## 2021-06-19 PROCEDURE — 93005 ELECTROCARDIOGRAM TRACING: CPT | Performed by: EMERGENCY MEDICINE

## 2021-06-19 PROCEDURE — 96374 THER/PROPH/DIAG INJ IV PUSH: CPT

## 2021-06-19 PROCEDURE — 36415 COLL VENOUS BLD VENIPUNCTURE: CPT

## 2021-06-19 RX ORDER — KETOROLAC TROMETHAMINE 30 MG/ML
15 INJECTION, SOLUTION INTRAMUSCULAR; INTRAVENOUS ONCE
Status: COMPLETED | OUTPATIENT
Start: 2021-06-19 | End: 2021-06-19

## 2021-06-19 RX ADMIN — KETOROLAC TROMETHAMINE 15 MG: 30 INJECTION, SOLUTION INTRAMUSCULAR at 21:10

## 2021-06-19 RX ADMIN — LIDOCAINE HYDROCHLORIDE: 20 SOLUTION ORAL; TOPICAL at 21:10

## 2021-06-19 ASSESSMENT — ENCOUNTER SYMPTOMS
COUGH: 0
EYE REDNESS: 0
RHINORRHEA: 0
DIARRHEA: 0
VOMITING: 0
SHORTNESS OF BREATH: 0
EYE PAIN: 0
ABDOMINAL PAIN: 0
VOICE CHANGE: 0

## 2021-06-19 ASSESSMENT — PAIN SCALES - GENERAL
PAINLEVEL_OUTOF10: 8
PAINLEVEL_OUTOF10: 8

## 2021-06-19 ASSESSMENT — PAIN DESCRIPTION - LOCATION: LOCATION: CHEST

## 2021-06-20 NOTE — ED PROVIDER NOTES
[facility] EMERGENCY DEPT  EMERGENCY DEPARTMENT ENCOUNTER      Pt Name: Adenike Tse  MRN: [id number]  [place] 1970  Date of evaluation: 6/19/2021  Provider: Josie Vang MD    CHIEF COMPLAINT       Chief Complaint   Patient presents with    Chest Pain         HISTORY OF PRESENT ILLNESS   (Location/Symptom, Timing/Onset,Context/Setting, Quality, Duration, Modifying Factors, Severity)  Note limiting factors. Adenike Tse is a 48 y.o. male who presents to the emergency department with complaint of chest pain that has been present over the last 18 hours. Pain started after eating lunch yesterday. Pain is localized to the left chest and worsens with movements. Has been trying Tums and antacids and belching but has not had improvement in the pain. Denies any associated shortness of breath, vomiting, diarrhea, leg swelling, or other symptoms. No cardiac history. Does have a history of hypertension and GERD. No previous stress test.  Pain does not worsen with exertion or specific positions. HPI    NursingNotes were reviewed. REVIEW OF SYSTEMS    (2-9 systems for level 4, 10 or more for level 5)     Review of Systems   Constitutional: Negative for fatigue and fever. HENT: Negative for congestion, rhinorrhea and voice change. Eyes: Negative for pain and redness. Respiratory: Negative for cough and shortness of breath. Cardiovascular: Positive for chest pain. Gastrointestinal: Negative for abdominal pain, diarrhea and vomiting. Endocrine: Negative. Genitourinary: Negative. Musculoskeletal: Negative for arthralgias and gait problem. Skin: Negative for rash and wound. Neurological: Negative for weakness and headaches. Hematological: Negative. Psychiatric/Behavioral: Negative. All other systems reviewed and are negative. A complete review of systems was performed and is negative except as noted above in the HPI.        PAST MEDICAL HISTORY     Past Medical History: Diagnosis Date    Arthritis     Depression     Gout     Hypertension     Vitamin D deficiency          SURGICAL HISTORY       Past Surgical History:   Procedure Laterality Date    APPENDECTOMY      JOINT REPLACEMENT Left     hip         CURRENT MEDICATIONS       Discharge Medication List as of 6/19/2021 10:07 PM      CONTINUE these medications which have NOT CHANGED    Details   predniSONE 10 MG (21) TBPK Follow label directions. , Disp-21 each, R-0Normal      meloxicam (MOBIC) 15 MG tablet Take 15 mg by mouth dailyHistorical Med      pantoprazole (PROTONIX) 40 MG tablet Take 1 tablet by mouth daily, Disp-30 tablet, R-1Print             ALLERGIES     Patient has no known allergies. FAMILY HISTORY     No family history on file. SOCIAL HISTORY       Social History     Socioeconomic History    Marital status: Single     Spouse name: Not on file    Number of children: Not on file    Years of education: Not on file    Highest education level: Not on file   Occupational History    Not on file   Tobacco Use    Smoking status: Former Smoker    Smokeless tobacco: Never Used   Vaping Use    Vaping Use: Never used   Substance and Sexual Activity    Alcohol use: Yes     Alcohol/week: 7.0 standard drinks     Types: 7 Cans of beer per week     Comment: occasional    Drug use: No    Sexual activity: Not on file   Other Topics Concern    Not on file   Social History Narrative    Not on file     Social Determinants of Health     Financial Resource Strain:     Difficulty of Paying Living Expenses:    Food Insecurity:     Worried About Running Out of Food in the Last Year:     [address] in the Last Year:    Transportation Needs:     Lack of Transportation (Medical):      Lack of Transportation (Non-Medical):    Physical Activity:     Days of Exercise per Week:     Minutes of Exercise per Session:    Stress:     Feeling of Stress :    Social Connections:     Frequency of Communication with Friends and Family:     Frequency of Social Gatherings with Friends and Family:     Attends Islam Services:     Active Member of Clubs or Organizations:     Attends Club or Organization Meetings:     Marital Status:    Intimate Partner Violence:     Fear of Current or Ex-Partner:     Emotionally Abused:     Physically Abused:     Sexually Abused:        SCREENINGS    [place] Coma Scale  Eye Opening: Spontaneous  Best Verbal Response: Oriented  Best Motor Response: Obeys commands  [place] Coma Scale Score: 15        PHYSICAL EXAM    (up to 7 for level 4, 8 or more for level 5)     ED Triage Vitals [06/19/21 2001]   BP Temp Temp Source Pulse Resp SpO2 Height Weight   124/85 98.8 °F (37.1 °C) Oral 62 20 96 % -- --       Physical Exam  Vitals and nursing note reviewed. Constitutional:       General: He is not in acute distress. Appearance: He is well-developed. He is obese. He is not diaphoretic. HENT:      Head: Normocephalic and atraumatic. Eyes:      General: No scleral icterus. Neck:      Vascular: No JVD. Cardiovascular:      Rate and Rhythm: Normal rate and regular rhythm. Pulses:           Radial pulses are 2+ on the right side and 2+ on the left side. Dorsalis pedis pulses are 2+ on the right side and 2+ on the left side. Heart sounds: Normal heart sounds. No murmur heard. No friction rub. No gallop. Pulmonary:      Effort: Pulmonary effort is normal. No accessory muscle usage or respiratory distress. Breath sounds: Normal breath sounds. No stridor. No decreased breath sounds, wheezing, rhonchi or rales. Chest:      Chest wall: No tenderness. Abdominal:      General: There is no distension. Palpations: Abdomen is soft. Tenderness: There is no abdominal tenderness. There is no guarding or rebound. Musculoskeletal:         General: No deformity. Normal range of motion. Right lower leg: No edema. Left lower leg: No edema.    Skin: General: Skin is warm and dry. Coloration: Skin is not pale. Findings: No erythema. Neurological:      Mental Status: He is alert and oriented to person, place, and time. GCS: GCS eye subscore is 4. GCS verbal subscore is 5. GCS motor subscore is 6. Cranial Nerves: No cranial nerve deficit. Motor: No abnormal muscle tone. Coordination: Coordination normal.   Psychiatric:         Behavior: Behavior normal.         Judgment: Judgment normal.         DIAGNOSTIC RESULTS     EKG: All EKG's are interpreted by the Emergency Department Physician who either signs or Co-signs this chart in the absence of a cardiologist.        RADIOLOGY:   Non-plain film images such as CT, Ultrasound and MRI are read by the radiologist. Plainradiographic images are visualized and preliminarily interpreted by the emergency physician with the below findings:        Interpretation per the Radiologist below, if available at the time of this note:    XR CHEST PORTABLE   Final Result   1. No radiographic evidence of acute cardiopulmonary process. Signed by Dr Librado Waller            ED BEDSIDE ULTRASOUND:   Performed by ED Physician - none    LABS:  Labs Reviewed   CBC WITH AUTO DIFFERENTIAL - Abnormal; Notable for the following components:       Result Value    RBC 4.18 (*)     Hemoglobin 12.0 (*)     Hematocrit 38.9 (*)     MCHC 30.8 (*)     All other components within normal limits   COMPREHENSIVE METABOLIC PANEL - Abnormal; Notable for the following components:    CO2 33 (*)     All other components within normal limits   TROPONIN       All other labs were within normal range or not returned as of this dictation.     Medications   ketorolac (TORADOL) injection 15 mg (15 mg Intravenous Given 6/19/21 2110)   aluminum & magnesium hydroxide-simethicone (MAALOX) 30 mL, lidocaine viscous hcl (XYLOCAINE) 5 mL (GI COCKTAIL) ( Oral Given 6/19/21 2110)       EMERGENCY DEPARTMENT COURSE and DIFFERENTIALDIAGNOSIS/MDM: Vitals:    Vitals:    06/19/21 2001 06/19/21 2200   BP: 124/85 138/86   Pulse: 62 60   Resp: 20 13   Temp: 98.8 °F (37.1 °C) 98.8 °F (37.1 °C)   TempSrc: Oral    SpO2: 96% 95%       [facility]    ED Course as of Jun 20 0701   Sat Jun 19, 2021 2053 EKG shows sinus rhythm with a rate of 62. There is early repolarization pattern but no evidence of acute ischemia or infarction. [JEFFERY]   2103 Work-up shows no evidence of cardiac ischemia, pneumothorax, aortic dissection, PE, or other concerning pathology. Given focal nature of the pain in the lateral chest with worsening with movement, sounds more musculoskeletal.  Patient does have belching and with pain onset after eating, will try GI cocktail for symptomatic relief. [JEFFERY]      ED Course User Index  [JEFFERY] Miri Hutchinson MD     Evaluation and work-up here revealed no signs of emergent or life-threatening pathology that would necessitate admission for further work-up or management at this time. Patient is felt to be stable for discharge home with return precautions for worsening of the condition or development of new concerning symptoms. Patient was encouraged to follow-up with their primary care doctor in the appropriate timeframe. Necessary prescriptions and information have been provided for treatment at home. Patient voices understanding and agreement with the plan. CONSULTS:  None    PROCEDURES:  Unless otherwise notedbelow, none     Procedures      FINAL IMPRESSION     1. Chest pain, unspecified type          DISPOSITION/PLAN   DISPOSITION Decision To Discharge 06/19/2021 09:00:35 PM      PATIENT REFERRED TO:  [facility] EMERGENCY DEPT  Mikey Paulino  [phone]    If symptoms worsen    Jaquelin George DO  [address] [facility] [address]   [address] [place] [id number]  [phone]      As needed      DISCHARGE MEDICATIONS:  Discharge Medication List as of 6/19/2021 10:07 PM             (Please note that portions of this note were completed with a voice recognition program.  Efforts were made to edit the dictations butoccasionally words are mis-transcribed.)    Siobhan Randolph MD (electronically signed)  AttendingEmergency Physician          Siobhan Alexander MD  06/20/21 6707

## 2021-06-21 LAB
EKG P AXIS: 59 DEGREES
EKG P-R INTERVAL: 162 MS
EKG Q-T INTERVAL: 368 MS
EKG QRS DURATION: 72 MS
EKG QTC CALCULATION (BAZETT): 371 MS
EKG T AXIS: 63 DEGREES

## 2022-01-11 ENCOUNTER — HOSPITAL ENCOUNTER (EMERGENCY)
Age: 52
Discharge: HOME | End: 2022-01-12
Payer: MEDICAID

## 2022-01-11 ENCOUNTER — APPOINTMENT (OUTPATIENT)
Dept: GENERAL RADIOLOGY | Age: 52
End: 2022-01-11
Payer: MEDICAID

## 2022-01-11 VITALS
RESPIRATION RATE: 15 BRPM | HEART RATE: 91 BPM | TEMPERATURE: 97.5 F | SYSTOLIC BLOOD PRESSURE: 139 MMHG | DIASTOLIC BLOOD PRESSURE: 87 MMHG | WEIGHT: 315 LBS | OXYGEN SATURATION: 94 % | HEIGHT: 74 IN | BODY MASS INDEX: 40.43 KG/M2
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
## 2022-01-11 PROCEDURE — 99283 EMERGENCY DEPT VISIT LOW MDM: CPT

## 2022-01-11 PROCEDURE — 73030 X-RAY EXAM OF SHOULDER: CPT

## 2022-01-11 PROCEDURE — 73560 X-RAY EXAM OF KNEE 1 OR 2: CPT

## 2022-01-11 RX ORDER — GABAPENTIN 300 MG/1
CAPSULE ORAL
COMMUNITY
Start: 2021-07-12

## 2022-01-11 RX ORDER — ROSUVASTATIN CALCIUM 5 MG/1
TABLET, COATED ORAL
COMMUNITY
Start: 2021-07-12

## 2022-01-11 RX ORDER — ALLOPURINOL 100 MG/1
TABLET ORAL
COMMUNITY
Start: 2021-07-12

## 2022-01-11 RX ORDER — AMLODIPINE BESYLATE 5 MG/1
5 TABLET ORAL DAILY
COMMUNITY

## 2022-01-11 ASSESSMENT — PAIN SCALES - GENERAL: PAINLEVEL_OUTOF10: 8

## 2022-01-11 NOTE — Clinical Note
Chato Abel was seen and treated in our emergency department on 1/11/2022. He may return to work on 01/17/2022. If you have any questions or concerns, please don't hesitate to call.       Ethelene Soulier, [place]

## 2022-01-12 PROCEDURE — 6370000000 HC RX 637 (ALT 250 FOR IP): Performed by: PHYSICIAN ASSISTANT

## 2022-01-12 RX ORDER — LIDOCAINE 4 G/G
1 PATCH TOPICAL ONCE
Status: DISCONTINUED | OUTPATIENT
Start: 2022-01-12 | End: 2022-01-12 | Stop reason: HOSPADM

## 2022-01-12 RX ORDER — IBUPROFEN 200 MG
600 TABLET ORAL ONCE
Status: COMPLETED | OUTPATIENT
Start: 2022-01-12 | End: 2022-01-12

## 2022-01-12 RX ORDER — LIDOCAINE 50 MG/G
1 PATCH TOPICAL DAILY
Qty: 10 PATCH | Refills: 0 | Status: SHIPPED | OUTPATIENT
Start: 2022-01-12 | End: 2022-01-22

## 2022-01-12 RX ORDER — LIDOCAINE 4 G/G
1 PATCH TOPICAL DAILY
Status: DISCONTINUED | OUTPATIENT
Start: 2022-01-12 | End: 2022-01-12

## 2022-01-12 RX ADMIN — IBUPROFEN 600 MG: 200 TABLET, FILM COATED ORAL at 00:39

## 2022-01-12 ASSESSMENT — ENCOUNTER SYMPTOMS
SHORTNESS OF BREATH: 0
ABDOMINAL PAIN: 0

## 2022-01-12 ASSESSMENT — PAIN SCALES - GENERAL: PAINLEVEL_OUTOF10: 9
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
## 2022-03-18 ENCOUNTER — TREATMENT (OUTPATIENT)
Dept: PHYSICAL THERAPY | Facility: CLINIC | Age: 52
End: 2022-03-18

## 2022-03-18 DIAGNOSIS — S39.012S STRAIN OF LUMBAR REGION, SEQUELA: ICD-10-CM

## 2022-03-18 DIAGNOSIS — M54.2 CERVICAL PAIN: Primary | ICD-10-CM

## 2022-03-18 PROCEDURE — 97163 PT EVAL HIGH COMPLEX 45 MIN: CPT | Performed by: PHYSICAL THERAPIST

## 2022-03-22 ENCOUNTER — TREATMENT (OUTPATIENT)
Dept: PHYSICAL THERAPY | Facility: CLINIC | Age: 52
End: 2022-03-22

## 2022-03-22 DIAGNOSIS — M54.2 CERVICAL PAIN: ICD-10-CM

## 2022-03-22 DIAGNOSIS — S39.012S STRAIN OF LUMBAR REGION, SEQUELA: Primary | ICD-10-CM

## 2022-03-22 PROCEDURE — 97140 MANUAL THERAPY 1/> REGIONS: CPT

## 2022-03-22 NOTE — PROGRESS NOTES
"                                                                Physical Therapy Treatment Note    Patient: Lee Deal                                                                     Visit Date: 3/22/2022  :     1970    Referring practitioner:    Abram Jauregui DO  Date of Initial Visit:          Type: THERAPY  Noted: 3/18/2022    Patient seen for 2 sessions    Visit Diagnoses:    ICD-10-CM ICD-9-CM   1. Strain of lumbar region, sequela  S39.012S 905.7   2. Cervical pain  M54.2 723.1     SUBJECTIVE     Subjective He reports he's been \"moved in ways that ain't been moved in a while.\" He feels stiff today.     PAIN: 5-610 > 2-3/10 (\"Ooh! You got me walkin' straight now!\"     OBJECTIVE     Objective      Manual Therapy     20461  Comments   Passive hip flex/ER/IR, piriformis, hamstring Bilaterally -- L very restricted, R hamstrings > L   IASTM with pink foam roller, prone Thoracolumbar paraspinals   STM with massage cream, prone Lumbar paraspinals, superior gluteals   Cervical rotational mobilizations, PA's, side glides    Cervical manual T/D with intermittment suboccipital release        Timed Minutes 45     Therapy Education/Self Care 36753   Details: Slow speed during cycling, don't overdo it   Given Home Exercise Program  symptom relief   Progress: New and Reinforced   Education provided to:  Patient   Level of understanding Verbalized   Timed Minutes      Total Timed Treatment:     45   mins  Total Time of Visit:             51   mins         ASSESSMENT/PLAN     GOALS  Goals                                                    Progress Note due by 22                                                                Recert due by 22   STG by: 3 weeks Comments Date Status   Improve mobility through thoracic and CT junction Hypomobile throughout thoracic and CT junction 3/22/22 ongoing   Decreased muscle guarding  throughout neck and shoulder girdle     ongoing   Able to get out of bed " without LBP.     ongoing   LTG by: 6 weeks         Perform sit to stand and stand to sit transfers with minimal use of hands and equal flexion in both knees and hips.     ongoing   Improve cervical rotation good to 50 with no pain     ongoing   Report no neck/shoulder pain except occasional minor twinges no more than 2-3/10    ongoing   Understands improved ergonomics for work and HEP for flexibility and posture     ongoing    Improve NDI score to <10  15 on 3/18/2022 (eval) 3/18/22 ongoing     Assessment/Plan     ASSESSMENT: No goals have been met at this time; however, this was his first visit since his initial evaluation. He presents with significantly restricted piriformis, hip ER/IR, hamstrings (L > R) and hypomobile cervical spine. He presented with guarding in lumbar spine and more soft tissue restrictions in L gluteals > R. Following his session, pt reported he felt improved and was able to stand more straight, demonstrating improved posture.     PLAN: Assess response to tx today and continue to address hypomobility and soft tissue restrictions with stretches and manual therapies to B LE and cervicothoracic spine. Consider bolstering HEP to reflect stretches.     SIGNATURE: Berklye Cox PTA, License #: [id number]    Electronically Signed on 3/22/2022        [address] [place] [address]  [phone]

## 2022-03-25 ENCOUNTER — TREATMENT (OUTPATIENT)
Dept: PHYSICAL THERAPY | Facility: CLINIC | Age: 52
End: 2022-03-25

## 2022-03-25 DIAGNOSIS — S39.012S STRAIN OF LUMBAR REGION, SEQUELA: Primary | ICD-10-CM

## 2022-03-25 DIAGNOSIS — M54.2 CERVICAL PAIN: ICD-10-CM

## 2022-03-25 PROCEDURE — 97110 THERAPEUTIC EXERCISES: CPT

## 2022-03-25 PROCEDURE — 97140 MANUAL THERAPY 1/> REGIONS: CPT

## 2022-03-25 NOTE — PROGRESS NOTES
"                                                                Physical Therapy Treatment Note    Patient: Lee Deal                                                                     Visit Date: 3/25/2022  :     1970    Referring practitioner:    Abram Jauregui DO  Date of Initial Visit:          Type: THERAPY  Noted: 3/18/2022    Patient seen for 3 sessions    Visit Diagnoses:    ICD-10-CM ICD-9-CM   1. Strain of lumbar region, sequela  S39.012S 905.7   2. Cervical pain  M54.2 723.1     SUBJECTIVE     Subjective He reports he felt better after last session but pain increased that night, especially in his groin. He did feel like it helped. His neck is still popping and back and tight. Neck was not popping after last session, \"had a free flow to it.\" He tried to sleep with the towel but it got lost in the middle of the night. He will lift up to 50 lbs in the gym, but he no longer \"goes heavy\" in the gym because of elbow pain. He reports he works on his arms and chest in gym and will use the bicycle in place of legs for cardio. He tried the slower speed and did notice that he sweated less, but felt more fatigue through B LE.     PAIN: 6/10 > 2/10     OBJECTIVE     Objective      Manual Therapy     84727  Comments   Figure 8 lumbar T/D  Sustained, reported relief   STM with massage cream, prone Lumbar paraspinals, superior gluteals (L > R), MHP to thoracic spine throughout (not billed)   Cervical rotational mobilizations, PA's, side glides and passive side bends Relief with all   Cervical manual T/D with intermittment suboccipital release Responds especially well to traction    Gentle B hip lateral mobilizations He was nervous about R hip d/t VLADIMIR, but reported good results       Timed Minutes 49     Therapeutic Exercises    08346 Comments   B SKTC with towel assist Added to HEP   Foot on ground piriformis stretch, towel assist Added to HEP   Supine hip ER - unable to ER enough to perform figure 4 " stretch so manual OP to assist        Timed Minutes 8     Therapy Education/Self Care 12245   Details: Discussed and encouraged precaution when lifting at gym, see below for new HEP exercises   Given Home Exercise Program  symptom relief   Progress: New and Reinforced   Education provided to:  Patient   Level of understanding Verbalized   Timed Minutes      Access Code: [email or web address]  Date: 03/25/2022  Prepared by: Berkley Cox    Exercises  Hooklying Single Knee to Chest Stretch - 1-2 x daily - 7 x weekly - 2 sets - 10 reps  Supine Piriformis Stretch with Foot on Ground - 1-2 x daily - 7 x weekly - 3 sets - 30 sec ea hold    Total Timed Treatment:     57   mins  Total Time of Visit:             60   mins         ASSESSMENT/PLAN     GOALS  Goals                                                    Progress Note due by 4/16/22                                                                Recert due by 6/14/22   STG by: 3 weeks Comments Date Status   Improve mobility through thoracic and CT junction Hypomobile throughout thoracic and CT junction 3/22/22 ongoing   Decreased muscle guarding  throughout neck and shoulder girdle     ongoing   Able to get out of bed without LBP.     ongoing   LTG by: 6 weeks         Perform sit to stand and stand to sit transfers with minimal use of hands and equal flexion in both knees and hips.     ongoing   Improve cervical rotation good to 50 with no pain     ongoing   Report no neck/shoulder pain except occasional minor twinges no more than 2-3/10    ongoing   Understands improved ergonomics for work and HEP for flexibility and posture Bolstered and printed out today, but pt left without printout  3/25/22 ongoing    Improve NDI score to <10  15 on 3/18/2022 (eval) 3/18/22 ongoing     Assessment/Plan     ASSESSMENT: Patient continues to respond very well to T/D in both cervical and lumbar spine, also presenting with soft tissue restrictions, especially in L gluteals. When t/f prone <>  "sitting, he will perform prone press-up, which increases strain on his lumbar spine. He also continues to be active in the gym, lifting \"light\" which he reports is no more than 50 lbs; however, he neglects many of his scapular stabilizers and avoids B LE which is likely contributing to his symptoms.     PLAN: Continue to address B hip flexibility and mobility and cervicothoracic mobility. Provide written printout of HEP bolstered today. Consider introducing hip and postural strengthening as these are areas he neglects while working out.     SIGNATURE: Berkley Cox PTA, License #: [id number]    Electronically Signed on 3/25/2022        [address] [place] [address]. [address]  [phone]  "

## 2022-03-30 ENCOUNTER — TREATMENT (OUTPATIENT)
Dept: PHYSICAL THERAPY | Facility: CLINIC | Age: 52
End: 2022-03-30

## 2022-03-30 DIAGNOSIS — M54.2 CERVICAL PAIN: ICD-10-CM

## 2022-03-30 DIAGNOSIS — S39.012S STRAIN OF LUMBAR REGION, SEQUELA: Primary | ICD-10-CM

## 2022-03-30 PROCEDURE — 97140 MANUAL THERAPY 1/> REGIONS: CPT | Performed by: PHYSICAL THERAPIST

## 2022-03-30 NOTE — PROGRESS NOTES
Physical Therapy Treatment Note    Patient: Lee Deal                                                                     Visit Date: 3/30/2022  :     1970    Referring practitioner:    Abram Jauregui DO  Date of Initial Visit:          Type: THERAPY  Noted: 3/18/2022    Patient seen for 4 sessions    Visit Diagnoses:    ICD-10-CM ICD-9-CM   1. Strain of lumbar region, sequela  S39.012S 905.7   2. Cervical pain  M54.2 723.1     SUBJECTIVE     Subjective He reports since the last session he was loose until today. He reports he has stiffened back up again. More issues neck than back    PAIN: 6/10         OBJECTIVE     Objective      Manual Therapy     22083  Comments   Cervical suboccipital releases, manual traction, and manual traction B lateral bends Grade 2 sustained HL   STM B lower lumbar region  R SL               Timed Minutes 40          Therapy Education/Self Care 67676   Details:    Given postural retraining  symptom relief   Progress: Reinforced   Education provided to:  Patient   Level of understanding Verbalized   Timed Minutes          Total Timed Treatment:     40   mins  Total Time of Visit:             40   mins         ASSESSMENT/PLAN     GOALS  Goals                                                    Progress Note due by 22                                                                Recert due by 22   STG by: 3 weeks Comments Date Status   Improve mobility through thoracic and CT junction Hypomobile throughout thoracic and CT junction 3/22/22 ongoing   Decreased muscle guarding  throughout neck and shoulder girdle  moderate guarding in cervical region today  3/30/22 ongoing   Able to get out of bed without LBP.     ongoing   LTG by: 6 weeks         Perform sit to stand and stand to sit transfers with minimal use of hands and equal flexion in both knees and hips.     ongoing   Improve cervical rotation good  to 50 with no pain     ongoing   Report no neck/shoulder pain except occasional minor twinges no more than 2-3/10     ongoing   Understands improved ergonomics for work and HEP for flexibility and posture     ongoing    Improve NDI score to <10  15 on 3/18/2022 (eval) 3/18/22 ongoing            Assessment/Plan     ASSESSMENT: He did present with moderate guarding in his cervical region and a minimal amount of guarding in his B lower lumbar region. He responded well to the last session so hopefully the repeat of some of the techniques as well as focusing more time on the cervical region today will yield the same results.    PLAN: Continue cervical mobilizations, consider introducing SNAGS as well as cervical AROM with B UE unweighted and a towel roll along thoracic if there was considerable long term relief from today's session. Continue a conservative progression in regard to his cervical symptoms.    SIGNATURE: Ron Hurt PTA, License #: [id number]  Electronically Signed on 3/30/2022        [address] [place] [address]. [address]  [phone]

## 2022-04-01 ENCOUNTER — TREATMENT (OUTPATIENT)
Dept: PHYSICAL THERAPY | Facility: CLINIC | Age: 52
End: 2022-04-01

## 2022-04-01 DIAGNOSIS — M54.2 CERVICAL PAIN: ICD-10-CM

## 2022-04-01 DIAGNOSIS — S39.012S STRAIN OF LUMBAR REGION, SEQUELA: Primary | ICD-10-CM

## 2022-04-01 PROCEDURE — 97140 MANUAL THERAPY 1/> REGIONS: CPT

## 2022-04-01 NOTE — PROGRESS NOTES
"                                                                Physical Therapy Treatment Note    Patient: Lee Deal                                                                     Visit Date: 2022  :     1970    Referring practitioner:    Abram Jauregui DO  Date of Initial Visit:          Type: THERAPY  Noted: 3/18/2022    Patient seen for 5 sessions    Visit Diagnoses:    ICD-10-CM ICD-9-CM   1. Strain of lumbar region, sequela  S39.012S 905.7   2. Cervical pain  M54.2 723.1     SUBJECTIVE     Subjective He reports that his neck hasn't been popping like it usually does. He reports that his back hurts more than his neck today. He reports that he's been turning his neck back and forth while driving. He is unsure whether neck will ever not be stiff. Pain is \"tolerable, but you can tell it's there.\"    PAIN: 6/10 > 0/10 (feels \"great!\", some SN pain)     OBJECTIVE     Objective      Manual Therapy     31929  Comments   Cervical suboccipital releases, manual traction, and passive lateral rotation     STM with massage cream, prone Thoracolumbar paraspinals   Cervical PA's and side glides     Thoracic PA's and side glides Reported good response   Figure 8 lumbar T/D, sustained Pt reported relief       Timed Minutes 45     Therapy Education/Self Care 28537   Details:    Given postural retraining  symptom relief   Progress: Reinforced   Education provided to:  Patient   Level of understanding Verbalized   Timed Minutes      Total Timed Treatment:     45   mins  Total Time of Visit:             45   mins         ASSESSMENT/PLAN     GOALS  Goals                                                    Progress Note due by 22                                                                Recert due by 22   STG by: 3 weeks Comments Date Status   Improve mobility through thoracic and CT junction Hypomobile throughout thoracic and CT junction 3/22/22 ongoing   Decreased muscle guarding  throughout " neck and shoulder girdle  moderate guarding in cervical region today  3/30/22 ongoing   Able to get out of bed without LBP.     ongoing   LTG by: 6 weeks         Perform sit to stand and stand to sit transfers with minimal use of hands and equal flexion in both knees and hips.     ongoing   Improve cervical rotation good to 50 with no pain     ongoing   Report no neck/shoulder pain except occasional minor twinges no more than 2-3/10     ongoing   Understands improved ergonomics for work and HEP for flexibility and posture When twisting to stretch, pt corrected himself as remembered he was encouraged to avoid twisting 4/1/22 ongoing    Improve NDI score to <10  15 on 3/18/2022 (eval) 3/18/22 ongoing      Assessment/Plan     ASSESSMENT: Patient continues to be very hypomobile throughout spine and responds very well to mobilizations and T/D. He reported absolved pain today, but did c/o sciatic nerve pain.     PLAN: Continue conservative progression, especially prioritizing spinal mobility. Consider assessing flexibility and mobility of his hips, including SLRDI's for sciatic nerve pain, if tolerated and appropriate. Consider bolstering HEP to reflect piriformis stretches, if appropriate.     SIGNATURE: Berkley Cox PTA, License #: [id number]    Electronically Signed on 4/1/2022        [address] [place] [address]. [address]  [phone]

## 2022-04-04 ENCOUNTER — TREATMENT (OUTPATIENT)
Dept: PHYSICAL THERAPY | Facility: CLINIC | Age: 52
End: 2022-04-04

## 2022-04-04 DIAGNOSIS — M54.2 CERVICAL PAIN: ICD-10-CM

## 2022-04-04 DIAGNOSIS — S39.012S STRAIN OF LUMBAR REGION, SEQUELA: Primary | ICD-10-CM

## 2022-04-04 PROCEDURE — 97140 MANUAL THERAPY 1/> REGIONS: CPT

## 2022-04-04 NOTE — PROGRESS NOTES
"                                                                Physical Therapy Treatment Note    Patient: Lee Deal                                                                     Visit Date: 2022  :     1970    Referring practitioner:    Abram Jauregui DO  Date of Initial Visit:          Type: THERAPY  Noted: 3/18/2022    Patient seen for 6 sessions    Visit Diagnoses:    ICD-10-CM ICD-9-CM   1. Strain of lumbar region, sequela  S39.012S 905.7   2. Cervical pain  M54.2 723.1     SUBJECTIVE     Subjective He reports that \"he felt good! We makin' progress.\"     PAIN: 4.5-5/10 > 1/10     OBJECTIVE     Objective      Manual Therapy     35030  Comments   Cervical suboccipital releases, manual T/D Split HL   STM with massage cream, prone Cervical paraspinals, B UT   Cervical PA's and side glides  HL   Thoracic PA's and side glides preone   Figure 8 lumbar T/D, sustained Reported a cavitation in region of L hip region but unable to identify exact location whether back or hip. Denied adverse reaction.    B hip lateral mobilization with gait belt \"you gotta write that one down. It just like stretched something in here (pointed to hip) and felt awesome!\"   Passive piriformis stretch Very limited on L d/t VLADIMIR       Timed Minutes 39     Therapy Education/Self Care [id number]   Details:    Given postural retraining  symptom relief   Progress: Reinforced   Education provided to:  Patient   Level of understanding Verbalized   Timed Minutes      Total Timed Treatment:     39   mins  Total Time of Visit:             39   mins         ASSESSMENT/PLAN     GOALS  Goals                                                    Progress Note due by 22                                                                Recert due by 22   STG by: 3 weeks Comments Date Status   Improve mobility through thoracic and CT junction Hypomobile throughout thoracic and CT junction 3/22/22 ongoing   Decreased muscle " guarding  throughout neck and shoulder girdle  moderate guarding in cervical region today  3/30/22 ongoing   Able to get out of bed without LBP.     ongoing   LTG by: 6 weeks         Perform sit to stand and stand to sit transfers with minimal use of hands and equal flexion in both knees and hips.     ongoing   Improve cervical rotation good to 50 with no pain     ongoing   Report no neck/shoulder pain except occasional minor twinges no more than 2-3/10  Continues to respond very well to mobilizations and manual therapies. Pain decreased to 1/10 by end of session today. 4/4/22 ongoing   Understands improved ergonomics for work and HEP for flexibility and posture When twisting to stretch, pt corrected himself as remembered he was encouraged to avoid twisting 4/1/22 ongoing    Improve NDI score to <10  15 on 3/18/2022 (eval) 3/18/22 ongoing      Assessment/Plan     ASSESSMENT: Patient is responding very well to the current course of tx, so continued today. He is very active within his daily life and lifts weights regularly at the gym, therefore it is possible his primary deficit lies within his hypomobility. His L VLADIMIR is also exorbitantly limiting his L hip mobility, which contributes to gait deficits and LBP.    PLAN: Continue conservative progression, especially prioritizing spinal mobility. Consider assessing flexibility and mobility of his hips, including SLRDI's for sciatic nerve pain, if tolerated and appropriate. Consider bolstering HEP to reflect piriformis stretches, if appropriate.     SIGNATURE: Berkley Cox PTA, License #: [id number]    Electronically Signed on 4/4/2022        [address] [place] [address]. [address]  [phone]

## 2022-04-12 ENCOUNTER — TREATMENT (OUTPATIENT)
Dept: PHYSICAL THERAPY | Facility: CLINIC | Age: 52
End: 2022-04-12

## 2022-04-12 DIAGNOSIS — S39.012S STRAIN OF LUMBAR REGION, SEQUELA: Primary | ICD-10-CM

## 2022-04-12 DIAGNOSIS — M54.2 CERVICAL PAIN: ICD-10-CM

## 2022-04-12 PROCEDURE — 97140 MANUAL THERAPY 1/> REGIONS: CPT | Performed by: PHYSICAL THERAPIST

## 2022-04-12 PROCEDURE — 97530 THERAPEUTIC ACTIVITIES: CPT | Performed by: PHYSICAL THERAPIST

## 2022-04-12 NOTE — PROGRESS NOTES
Physical Therapy Treatment Note and 30 Day Progress Note    Patient: Lee Deal                                                                     Visit Date: 2022  :     1970    Referring practitioner:    Abram Jauregui DO  Date of Initial Visit:          Type: THERAPY  Noted: 3/18/2022    Patient seen for 7 sessions    Visit Diagnoses:    ICD-10-CM ICD-9-CM   1. Strain of lumbar region, sequela  S39.012S 905.7   2. Cervical pain  M54.2 723.1     SUBJECTIVE     Subjective   His allergies are bad. His head is congested.     PAIN: 5-6/10 > 1/10  PT G-Codes  Outcome Measure Options: Neck Disability Index (NDI)  Neck Disability Index Score: 11  OBJECTIVE     Objective      Manual Therapy     22323  Comments   Long axis distraction- each LE    Lateral distraction using gait belt each hip    Knee to chest     Prone- IASTM thoracic spine with foam roller    STM along upper traps, paraspinals    Timed Minutes 35        Therapeutic Activities    85267 Comments   Worked on sit to stand to sit transfers Different table heights. Focused on trying to use both legs more equally   Cervical rotation Upright posture- slow, repetitive               Timed Minutes 10         Therapy Education/Self Care 88239   Details:    Given postural retraining  symptom relief   Progress: Reinforced   Education provided to:  Patient   Level of understanding Verbalized   Timed Minutes      Total Timed Treatment:  45   mins  Total Time of Visit:            45   mins         ASSESSMENT/PLAN     GOALS  Goals                                                    Progress Note due by 22                                                                Recert due by 22   STG by: 3 weeks Comments Date Status   Improve mobility through thoracic and CT junction Hypomobile throughout thoracic and CT junction 22 ongoing   Decreased muscle guarding  throughout neck  and shoulder girdle  moderate guarding in cervical region today 4/12/22 ongoing   Able to get out of bed without LBP.  stiffness but less pain  4/12/22 Met   LTG by: 6 weeks        Perform sit to stand and stand to sit transfers with minimal use of hands and equal flexion in both knees and hips.    4/12/22 ongoing   Improve cervical rotation good to 50 with no pain  R rotation is 50. L is slightly less  4/12/22 Partially met   Report no neck/shoulder pain except occasional minor twinges no more than 2-3/10  Continues to respond very well to mobilizations and manual therapies. Pain decreased to 1/10 by end of session today. 4/12/22 ongoing   Understands improved ergonomics for work and HEP for flexibility and posture Has an understanding, but does have difficulty following 4/12/22 Met    Improve NDI score to <10  15 on 3/18/2022 (eval); 11 on 4/12/22 4/12/22 Improving      Assessment & Plan       Plan  Therapy options: will be seen for skilled therapy services  Frequency: 2x week  Duration in weeks: 8  Treatment plan discussed with: patient  Plan details: Cont emphasis on mobility and mechanics for better long term maintenance.         ASSESSMENT: He has met 2 and partially met another of his goals so far. He continues to have stiffness, but pain is decreasing. Further education done today encouraging him to try to use both legs more equally when he is transferring as well as performing cervical stretches with good posture more throughout the day. He showed a good understanding. He was very congested today, so lying prone was cut a bit shorter than we had in other visits.     PLAN:     SIGNATURE: Anna Mancia, PT, DPT, DONALD-ORESTES, License #: [id number]    Electronically Signed on 4/12/2022        [address] [place] [address]. [address]  [phone]

## 2022-04-14 ENCOUNTER — TREATMENT (OUTPATIENT)
Dept: PHYSICAL THERAPY | Facility: CLINIC | Age: 52
End: 2022-04-14

## 2022-04-14 DIAGNOSIS — S39.012S STRAIN OF LUMBAR REGION, SEQUELA: Primary | ICD-10-CM

## 2022-04-14 DIAGNOSIS — M54.2 CERVICAL PAIN: ICD-10-CM

## 2022-04-14 PROCEDURE — 97110 THERAPEUTIC EXERCISES: CPT | Performed by: PHYSICAL THERAPIST

## 2022-04-14 PROCEDURE — 97140 MANUAL THERAPY 1/> REGIONS: CPT | Performed by: PHYSICAL THERAPIST

## 2022-04-14 NOTE — PROGRESS NOTES
"                                                                Physical Therapy Treatment Note    Patient: Lee Deal                                                                     Visit Date: 2022  :     1970    Referring practitioner:    Abram Jauregui DO  Date of Initial Visit:          Type: THERAPY  Noted: 3/18/2022    Patient seen for 8 sessions    Visit Diagnoses:    ICD-10-CM ICD-9-CM   1. Strain of lumbar region, sequela  S39.012S 905.7   2. Cervical pain  M54.2 723.1     SUBJECTIVE     Subjective He reports since his last session he has felt\"alright\". He reports his neck is \"uncomforatable.\" Reports back isn't as big an issue today.     PAIN: 4/10         OBJECTIVE     Objective      Manual Therapy     72573  Comments   Cervical suboccipital releases, manual traction, and manual traction B lateral bends Grade 2 sustained    Manual B UT and pec stretches                Timed Minutes 18        Therapeutic Exercises    97392 Comments   B unilateral serratus punches with #2 in HL x 15    HL \"y's\" with yellow T band x 15    HL \"t's\" with yellow T band x 15     HL tummy tucks with 10 sec holds x 10        Timed Minutes 23       Therapy Education/Self Care 91971   Details:    Given postural retraining   Progress: Reinforced   Education provided to:  Patient   Level of understanding Verbalized   Timed Minutes          Total Timed Treatment:    41   mins  Total Time of Visit:             41   mins         ASSESSMENT/PLAN     GOALS    Goals                                                    Progress Note due by 22                                                                Recert due by 22   STG by: 3 weeks Comments Date Status   Improve mobility through thoracic and CT junction Hypomobile throughout thoracic and CT junction 22 ongoing   Decreased muscle guarding  throughout neck and shoulder girdle  moderate guarding in cervical region today 22 ongoing   Able to get " out of bed without LBP.  stiffness but less pain  4/12/22 Met   LTG by: 6 weeks         Perform sit to stand and stand to sit transfers with minimal use of hands and equal flexion in both knees and hips.    4/12/22 ongoing   Improve cervical rotation good to 50 with no pain  R rotation is 50. L is slightly less  4/12/22 Partially met   Report no neck/shoulder pain except occasional minor twinges no more than 2-3/10  4/10 today 4/14/22 ongoing   Understands improved ergonomics for work and HEP for flexibility and posture Has an understanding, but does have difficulty following 4/12/22 Met    Improve NDI score to <10  15 on 3/18/2022 (eval); 11 on 4/12/22 4/12/22 Improving       Assessment/Plan     ASSESSMENT: He was still a little guarded in the cervical region today and has difficulty relaxing during cervical mobilizations. He does exhibit straightening of the cervical region and a loss of the natural curve and we ill need to work on restoring that over time.    PLAN: We are still awaiting continued authorizations and will likely have an extended lapse in his POC. Therefore, we may need to reassess his long term response and inquire about flared symptoms and HEP compliance.    SIGNATURE: Ron Hurt PTA, [place] License #: [id number]  Electronically Signed on 4/14/2022        [address] [place] [address] [place]. [address]  [phone]

## 2023-05-14 ENCOUNTER — APPOINTMENT (OUTPATIENT)
Dept: GENERAL RADIOLOGY | Age: 53
End: 2023-05-14
Payer: MEDICAID

## 2023-05-14 ENCOUNTER — HOSPITAL ENCOUNTER (EMERGENCY)
Age: 53
Discharge: HOME | End: 2023-05-14
Attending: EMERGENCY MEDICINE
Payer: MEDICAID

## 2023-05-14 VITALS
HEIGHT: 74 IN | WEIGHT: 315 LBS | TEMPERATURE: 98.3 F | DIASTOLIC BLOOD PRESSURE: 85 MMHG | SYSTOLIC BLOOD PRESSURE: 136 MMHG | RESPIRATION RATE: 20 BRPM | OXYGEN SATURATION: 95 % | BODY MASS INDEX: 40.43 KG/M2 | HEART RATE: 70 BPM

## 2023-05-14 DIAGNOSIS — R06.02 SHORTNESS OF BREATH: Primary | ICD-10-CM

## 2023-05-14 DIAGNOSIS — R42 LIGHT HEADEDNESS: ICD-10-CM

## 2023-05-14 LAB
ALBUMIN SERPL-MCNC: 4.1 G/DL (ref 3.5–5.2)
ALP SERPL-CCNC: 79 U/L (ref 40–130)
ALT SERPL-CCNC: 41 U/L (ref 5–41)
ANION GAP SERPL CALCULATED.3IONS-SCNC: 13 MMOL/L (ref 7–19)
AST SERPL-CCNC: 32 U/L (ref 5–40)
BASOPHILS # BLD: 0.1 K/UL (ref 0–0.2)
BASOPHILS NFR BLD: 1.6 % (ref 0–1)
BILIRUB SERPL-MCNC: <0.2 MG/DL (ref 0.2–1.2)
BNP BLD-MCNC: <36 PG/ML (ref 0–124)
BUN SERPL-MCNC: 12 MG/DL (ref 6–20)
CALCIUM SERPL-MCNC: 9.4 MG/DL (ref 8.6–10)
CHLORIDE SERPL-SCNC: 105 MMOL/L (ref 98–111)
CO2 SERPL-SCNC: 22 MMOL/L (ref 22–29)
CREAT SERPL-MCNC: 1.1 MG/DL (ref 0.5–1.2)
D DIMER PPP FEU-MCNC: 0.39 UG/ML FEU (ref 0–0.48)
EOSINOPHIL # BLD: 0.3 K/UL (ref 0–0.6)
EOSINOPHIL NFR BLD: 6.4 % (ref 0–5)
ERYTHROCYTE [DISTWIDTH] IN BLOOD BY AUTOMATED COUNT: 13.1 % (ref 11.5–14.5)
GLUCOSE SERPL-MCNC: 119 MG/DL (ref 74–109)
HCT VFR BLD AUTO: 39 % (ref 42–52)
HGB BLD-MCNC: 12.2 G/DL (ref 14–18)
IMM GRANULOCYTES # BLD: 0.1 K/UL
LYMPHOCYTES # BLD: 1.8 K/UL (ref 1.1–4.5)
LYMPHOCYTES NFR BLD: 36.7 % (ref 20–40)
MCH RBC QN AUTO: 28.2 PG (ref 27–31)
MCHC RBC AUTO-ENTMCNC: 31.3 G/DL (ref 33–37)
MCV RBC AUTO: 90.3 FL (ref 80–94)
MONOCYTES # BLD: 0.4 K/UL (ref 0–0.9)
MONOCYTES NFR BLD: 7.8 % (ref 0–10)
NEUTROPHILS # BLD: 2.3 K/UL (ref 1.5–7.5)
NEUTS SEG NFR BLD: 45.9 % (ref 50–65)
PLATELET # BLD AUTO: 221 K/UL (ref 130–400)
PMV BLD AUTO: 11.3 FL (ref 9.4–12.4)
POTASSIUM SERPL-SCNC: 4.1 MMOL/L (ref 3.5–5)
PROT SERPL-MCNC: 6.9 G/DL (ref 6.6–8.7)
RBC # BLD AUTO: 4.32 M/UL (ref 4.7–6.1)
SODIUM SERPL-SCNC: 140 MMOL/L (ref 136–145)
TROPONIN T SERPL-MCNC: <0.01 NG/ML (ref 0–0.03)
WBC # BLD AUTO: 5 K/UL (ref 4.8–10.8)

## 2023-05-14 PROCEDURE — 71045 X-RAY EXAM CHEST 1 VIEW: CPT

## 2023-05-14 PROCEDURE — 84484 ASSAY OF TROPONIN QUANT: CPT

## 2023-05-14 PROCEDURE — 2580000003 HC RX 258: Performed by: EMERGENCY MEDICINE

## 2023-05-14 PROCEDURE — 80053 COMPREHEN METABOLIC PANEL: CPT

## 2023-05-14 PROCEDURE — 83880 ASSAY OF NATRIURETIC PEPTIDE: CPT

## 2023-05-14 PROCEDURE — 93005 ELECTROCARDIOGRAM TRACING: CPT | Performed by: EMERGENCY MEDICINE

## 2023-05-14 PROCEDURE — 85379 FIBRIN DEGRADATION QUANT: CPT

## 2023-05-14 PROCEDURE — 36415 COLL VENOUS BLD VENIPUNCTURE: CPT

## 2023-05-14 PROCEDURE — 85025 COMPLETE CBC W/AUTO DIFF WBC: CPT

## 2023-05-14 PROCEDURE — 99285 EMERGENCY DEPT VISIT HI MDM: CPT

## 2023-05-14 PROCEDURE — 71045 X-RAY EXAM CHEST 1 VIEW: CPT | Performed by: RADIOLOGY

## 2023-05-14 RX ORDER — SODIUM CHLORIDE, SODIUM LACTATE, POTASSIUM CHLORIDE, AND CALCIUM CHLORIDE .6; .31; .03; .02 G/100ML; G/100ML; G/100ML; G/100ML
1000 INJECTION, SOLUTION INTRAVENOUS ONCE
Status: COMPLETED | OUTPATIENT
Start: 2023-05-14 | End: 2023-05-14

## 2023-05-14 RX ADMIN — SODIUM CHLORIDE, POTASSIUM CHLORIDE, SODIUM LACTATE AND CALCIUM CHLORIDE 1000 ML: 600; 310; 30; 20 INJECTION, SOLUTION INTRAVENOUS at 22:05

## 2023-05-14 ASSESSMENT — ENCOUNTER SYMPTOMS
SHORTNESS OF BREATH: 1
EYES NEGATIVE: 1
CHEST TIGHTNESS: 0
GASTROINTESTINAL NEGATIVE: 1

## 2023-05-15 LAB
EKG P AXIS: 45 DEGREES
EKG P-R INTERVAL: 164 MS
EKG Q-T INTERVAL: 352 MS
EKG QRS DURATION: 76 MS
EKG QTC CALCULATION (BAZETT): 378 MS
EKG T AXIS: 62 DEGREES

## 2023-05-15 PROCEDURE — 93010 ELECTROCARDIOGRAM REPORT: CPT | Performed by: INTERNAL MEDICINE

## 2023-05-15 NOTE — ED PROVIDER NOTES
[facility] EMERGENCY DEPT  EMERGENCY DEPARTMENT ENCOUNTER      Pt Name: Nori Rossi  MRN: [id number]  [place] 1970  Date of evaluation: 5/14/2023  Provider: Meera Lynch MD    CHIEF COMPLAINT       Chief Complaint   Patient presents with    Shortness of Breath     PT c/o sudden onset SOA after eating seafood, PT denies known allergy          HISTORY OF PRESENT ILLNESS   (Location/Symptom, Timing/Onset,Context/Setting, Quality, Duration, Modifying Factors, Severity)  Note limiting factors. Nori Rossi is a 46 y.o. male who presents to the emergency department for evaluation after developing lightheadedness, dry mouth, shortness of breath. Marisue Quintero it happened while he was eating dinner tonight. Denies any other associated symptoms such as wheezing, chest pain, leg swelling, rash, itching. No history of heart problems or breathing problems such as asthma or COPD. No prior allergic reactions. No feeling of throat swelling, nausea, vomiting. HPI    NursingNotes were reviewed. REVIEW OF SYSTEMS    (2-9 systems for level 4, 10 or more for level 5)     Review of Systems   Constitutional: Negative. HENT: Negative. Negative for congestion. Eyes: Negative. Respiratory:  Positive for shortness of breath. Negative for chest tightness. Cardiovascular: Negative. Negative for chest pain and leg swelling. Gastrointestinal: Negative. Genitourinary: Negative. Musculoskeletal: Negative. Skin: Negative. Neurological:  Positive for light-headedness. Hematological: Negative. Psychiatric/Behavioral: Negative. A complete review of systems was performed and is negative except as noted above in the HPI.        PAST MEDICAL HISTORY     Past Medical History:   Diagnosis Date    Arthritis     Depression     Gout     Hypertension     Vitamin D deficiency          SURGICAL HISTORY       Past Surgical History:   Procedure Laterality Date    APPENDECTOMY      JOINT REPLACEMENT Left     hip

## 2023-06-22 ENCOUNTER — APPOINTMENT (OUTPATIENT)
Dept: CT IMAGING | Age: 53
End: 2023-06-22
Payer: MEDICAID

## 2023-06-22 ENCOUNTER — HOSPITAL ENCOUNTER (EMERGENCY)
Age: 53
Discharge: HOME | End: 2023-06-22
Payer: MEDICAID

## 2023-06-22 ENCOUNTER — APPOINTMENT (OUTPATIENT)
Dept: GENERAL RADIOLOGY | Age: 53
End: 2023-06-22
Payer: MEDICAID

## 2023-06-22 VITALS
SYSTOLIC BLOOD PRESSURE: 132 MMHG | OXYGEN SATURATION: 99 % | RESPIRATION RATE: 20 BRPM | DIASTOLIC BLOOD PRESSURE: 88 MMHG | BODY MASS INDEX: 39.17 KG/M2 | HEIGHT: 75 IN | HEART RATE: 78 BPM | WEIGHT: 315 LBS | TEMPERATURE: 98 F
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
## 2023-06-22 PROCEDURE — 73030 X-RAY EXAM OF SHOULDER: CPT

## 2023-06-22 PROCEDURE — 99284 EMERGENCY DEPT VISIT MOD MDM: CPT

## 2023-06-22 PROCEDURE — 72131 CT LUMBAR SPINE W/O DYE: CPT

## 2023-06-22 PROCEDURE — 73502 X-RAY EXAM HIP UNI 2-3 VIEWS: CPT

## 2023-06-22 PROCEDURE — 6370000000 HC RX 637 (ALT 250 FOR IP): Performed by: PHYSICIAN ASSISTANT

## 2023-06-22 PROCEDURE — 72125 CT NECK SPINE W/O DYE: CPT

## 2023-06-22 PROCEDURE — 70450 CT HEAD/BRAIN W/O DYE: CPT

## 2023-06-22 RX ORDER — METHOCARBAMOL 500 MG/1
500 TABLET, FILM COATED ORAL 4 TIMES DAILY
Qty: 20 TABLET | Refills: 0 | Status: SHIPPED | OUTPATIENT
Start: 2023-06-22 | End: 2023-06-27

## 2023-06-22 RX ORDER — LOSARTAN POTASSIUM 25 MG/1
25 TABLET ORAL DAILY
COMMUNITY

## 2023-06-22 RX ORDER — METHOCARBAMOL 500 MG/1
500 TABLET, FILM COATED ORAL ONCE
Status: COMPLETED | OUTPATIENT
Start: 2023-06-22 | End: 2023-06-22

## 2023-06-22 RX ORDER — HYDROCODONE BITARTRATE AND ACETAMINOPHEN 5; 325 MG/1; MG/1
1 TABLET ORAL ONCE
Status: COMPLETED | OUTPATIENT
Start: 2023-06-22 | End: 2023-06-22

## 2023-06-22 RX ADMIN — METHOCARBAMOL 500 MG: 500 TABLET ORAL at 22:04

## 2023-06-22 RX ADMIN — HYDROCODONE BITARTRATE AND ACETAMINOPHEN 1 TABLET: 5; 325 TABLET ORAL at 22:04

## 2023-06-22 ASSESSMENT — PAIN SCALES - GENERAL
PAINLEVEL_OUTOF10: 1
PAINLEVEL_OUTOF10: 7
PAINLEVEL_OUTOF10: 5

## 2023-06-22 ASSESSMENT — PAIN - FUNCTIONAL ASSESSMENT: PAIN_FUNCTIONAL_ASSESSMENT: 0-10

## 2023-06-23 ASSESSMENT — ENCOUNTER SYMPTOMS
BACK PAIN: 1
PHOTOPHOBIA: 0
ABDOMINAL PAIN: 0
SHORTNESS OF BREATH: 0
VOMITING: 0
NAUSEA: 0

## 2023-06-23 NOTE — ED PROVIDER NOTES
Lymphadenopathy:      Cervical: No cervical adenopathy. Skin:     General: Skin is warm and dry. Neurological:      General: No focal deficit present. Mental Status: He is alert and oriented to person, place, and time. DIAGNOSTIC RESULTS     RADIOLOGY:  Non-plain film images such as CT, Ultrasound and MRI are read by the radiologist. Plain radiographic images are visualized and preliminarily interpreted bythe emergency physician with the below findings:    [place]   Final Result   1. No fracture or malalignment. 2.  Degenerative changes as described above. 3.  Atherosclerosis. 4.  Otherwise unremarkable CT scan of the lumbar spine. All CT scans are performed using dose optimization techniques as appropriate to the performed exam and include    at least one of the following: Automated exposure control, adjustment of the mA and/or kV according to size, and the use of iterative reconstruction technique. ______________________________________    Electronically signed by: Zachariah Marie M.D. Date:     06/22/2023   Time:    21:00       CT CERVICAL SPINE WO CONTRAST   Final Result   1. No fracture. 2.  Normal alignment. 3.  Mild degenerative changes. 4.  Otherwise unremarkable images of the cervical spine. All CT scans are performed using dose optimization techniques as appropriate to the performed exam and include    at least one of the following: Automated exposure control, adjustment of the mA and/or kV according to size, and the use of iterative reconstruction technique. ______________________________________    Electronically signed by: Zachariah Marie M.D. Date:     06/22/2023   Time:    20:56       CT HEAD WO CONTRAST   Final Result   1. No acute intracranial pathology.         All CT scans are performed using dose optimization techniques as appropriate to the performed exam and include    at least one of the following: Automated exposure

## 2023-07-08 ENCOUNTER — APPOINTMENT (OUTPATIENT)
Dept: CT IMAGING | Age: 53
End: 2023-07-08
Payer: MEDICAID

## 2023-07-08 ENCOUNTER — HOSPITAL ENCOUNTER (EMERGENCY)
Age: 53
Discharge: HOME | End: 2023-07-08
Attending: PEDIATRICS
Payer: MEDICAID

## 2023-07-08 VITALS
SYSTOLIC BLOOD PRESSURE: 147 MMHG | HEART RATE: 82 BPM | BODY MASS INDEX: 43.07 KG/M2 | DIASTOLIC BLOOD PRESSURE: 84 MMHG | RESPIRATION RATE: 16 BRPM | OXYGEN SATURATION: 96 % | WEIGHT: 315 LBS | TEMPERATURE: 98.4 F
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
## 2023-07-08 DIAGNOSIS — S70.11XA CONTUSION OF RIGHT THIGH, INITIAL ENCOUNTER: Primary | ICD-10-CM

## 2023-07-08 PROCEDURE — 99284 EMERGENCY DEPT VISIT MOD MDM: CPT

## 2023-07-08 PROCEDURE — 6370000000 HC RX 637 (ALT 250 FOR IP): Performed by: PEDIATRICS

## 2023-07-08 PROCEDURE — 72192 CT PELVIS W/O DYE: CPT

## 2023-07-08 RX ORDER — OXYCODONE AND ACETAMINOPHEN 10; 325 MG/1; MG/1
1 TABLET ORAL ONCE
Status: COMPLETED | OUTPATIENT
Start: 2023-07-08 | End: 2023-07-08

## 2023-07-08 RX ORDER — IBUPROFEN 800 MG/1
800 TABLET ORAL EVERY 8 HOURS PRN
Qty: 20 TABLET | Refills: 1 | Status: SHIPPED | OUTPATIENT
Start: 2023-07-08

## 2023-07-08 RX ADMIN — OXYCODONE HYDROCHLORIDE AND ACETAMINOPHEN 1 TABLET: 10; 325 TABLET ORAL at 14:27

## 2023-07-08 ASSESSMENT — PAIN SCALES - GENERAL: PAINLEVEL_OUTOF10: 10

## 2023-07-08 NOTE — DISCHARGE INSTRUCTIONS
Return or seek medical attention with increasing or severe pain, redness, increased swelling, or other concerns.
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
## 2023-08-07 ENCOUNTER — TREATMENT (OUTPATIENT)
Dept: PHYSICAL THERAPY | Facility: CLINIC | Age: 53
End: 2023-08-07
Payer: OTHER MISCELLANEOUS

## 2023-08-07 DIAGNOSIS — M25.551 PAIN IN RIGHT HIP: Primary | ICD-10-CM
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
## 2023-08-25 ENCOUNTER — TREATMENT (OUTPATIENT)
Dept: PHYSICAL THERAPY | Facility: CLINIC | Age: 53
End: 2023-08-25
Payer: OTHER MISCELLANEOUS

## 2023-08-25 DIAGNOSIS — M25.551 PAIN IN RIGHT HIP: Primary | ICD-10-CM

## 2023-08-25 PROCEDURE — 97535 SELF CARE MNGMENT TRAINING: CPT | Performed by: PHYSICAL THERAPIST

## 2023-08-25 PROCEDURE — 97110 THERAPEUTIC EXERCISES: CPT | Performed by: PHYSICAL THERAPIST

## 2023-08-25 PROCEDURE — 97140 MANUAL THERAPY 1/> REGIONS: CPT | Performed by: PHYSICAL THERAPIST

## 2023-08-25 NOTE — PROGRESS NOTES
"                                                                Physical Therapy Treatment Note  [address] [place] [address]    Patient: Lee Deal                                                 Visit Date: 2023  :     1970    Referring practitioner:    Abram Jauregui DO  Date of Initial Visit:          Type: THERAPY  Noted: 2023    Patient seen for 2 sessions    Visit Diagnoses:    ICD-10-CM ICD-9-CM   1. Pain in right hip  M25.551 719.45     SUBJECTIVE     Subjective:  He had his MRI which revealed mod AVN of right femoral head, OA with spurs and a possible posterior superior labral tear.     PAIN: -7/10         OBJECTIVE     Objective     Manual Therapy     [id number]  Comments   HL right hip lateral distraction With gait belt   Supine right LE LAD With gait belt               Timed Minutes 10       Therapeutic Exercises    92609 Units Comments   Gentle right hip flexion ROM     clams 10\" x 10    Reverse clams 10\" x 10    Right bent knee fallout 10\" x 10              Timed Minutes 15       Therapy Education/Self Care 60286   Education offered today Educated on MRI findings including anatomy of the findings. Suggested Dr. Leyva would be able to give him more definitive answers on the plan.   Added HEP  Advised to get a SC to help gait   [employer] Code Access Code: [email or web address]  URL: [email or web address]   Ongoing HEP     Date: 2023  Prepared by: Yannick Liz    Exercises  - Clamshell  - 2 x daily - 7 x weekly - 2 sets - 10 reps  - Sidelying Reverse Clamshell  - 2 x daily - 7 x weekly - 2 sets - 10 reps  - Bent Knee Fallouts with Alternating Legs  - 2 x daily - 7 x weekly - 2 sets - 10 reps     Timed Minutes 20       Total Timed Treatment:     45   mins  Total Time of Visit:            45   mins    ASSESSMENT/PLAN     GOALS:  Goals                                          Progress Note due by 2023                                                      Recert due by 2023 "   STG by: 6 weeks Comments Date Status   Improve pain-free R hip ROM to +/- 5 degrees of LLE.      Improve LEFS score by >/= 9 points.       Demonstrate compliance and understanding with HEP for improved strength/mobility.       Improve R hip MMT to 4+/5 and pain free.       LTG by: 12 weeks      Improve LEFS score to >/=40/50.      Pt will be able to sit for >/30 mins w/ pain no greater than 3/10.      Pt will ascend/descend 5 steps w/o HR and no pain.       Pt will ambulate 100' or more w/o AD and pain no greater than 1/10.  Still quite a bit of pain with a very antalgic gait on right 8/25 ongoing     Assessment/Plan     ASSESSMENT:   Today was his first visit since his eval. Dr. Leyva will be able to help him make the decision on whether he feels a THR is needed.     PLAN:   Continue to work on gentle mobility and hip stability.     SIGNATURE: Yannick Liz, PT, [place] License #: [id number]  Electronically Signed on 8/25/2023        [facility] [place] [address]  [phone]

## 2023-09-06 ENCOUNTER — TREATMENT (OUTPATIENT)
Dept: PHYSICAL THERAPY | Facility: CLINIC | Age: 53
End: 2023-09-06
Payer: OTHER MISCELLANEOUS

## 2023-09-06 DIAGNOSIS — M25.551 PAIN IN RIGHT HIP: Primary | ICD-10-CM

## 2023-09-06 DIAGNOSIS — S39.012S STRAIN OF LUMBAR REGION, SEQUELA: ICD-10-CM

## 2023-09-06 PROCEDURE — 97140 MANUAL THERAPY 1/> REGIONS: CPT | Performed by: PHYSICAL THERAPIST

## 2023-09-06 PROCEDURE — 97110 THERAPEUTIC EXERCISES: CPT | Performed by: PHYSICAL THERAPIST

## 2023-09-06 NOTE — PROGRESS NOTES
"                                                                Physical Therapy Treatment Note and 30 Day Progress Note  [address] [place] [address]    Patient: Lee Deal                                                 Visit Date: 2023  :     1970    Referring practitioner:    Abram Jauregui DO  Date of Initial Visit:          Type: THERAPY  Noted: 2023    Patient seen for 3 sessions    Visit Diagnoses:    ICD-10-CM ICD-9-CM   1. Pain in right hip  M25.551 719.45   2. Strain of lumbar region, sequela  S39.012S 905.7     SUBJECTIVE     Subjective:  He says he got relief that day after PT but it returned.     PAIN: 8-9/10       OBJECTIVE     Objective     Manual Therapy     65844  Comments   HL right hip lateral distraction With gait belt   Supine right LE LAD With gait belt               Timed Minutes 10     Therapeutic Exercises    70047 Units Comments   Gentle right hip flexion ROM     clams 10\" x 10    Sustained right hip abd 10\" 3x5 Needed asst to start and was able to do on own at end   Right bent knee fallout 10\" x 10              Timed Minutes 35       Therapy Education/Self Care 79902   Education offered today Educated on MRI findings including anatomy of the findings. Suggested Dr. Leyva would be able to give him more definitive answers on the plan.   Added HEP  Advised to get a SC to help gait   [employer] Code Access Code: [email or web address]  URL: [email or web address]   Ongoing HEP     Date: 2023  Prepared by: Yannick Liz    Exercises  - Clamshell  - 2 x daily - 7 x weekly - 2 sets - 10 reps  - Sidelying Reverse Clamshell  - 2 x daily - 7 x weekly - 2 sets - 10 reps  - Bent Knee Fallouts with Alternating Legs  - 2 x daily - 7 x weekly - 2 sets - 10 reps     Timed Minutes        Total Timed Treatment:     45   mins  Total Time of Visit:            45   mins    ASSESSMENT/PLAN     GOALS:  Goals                                          Progress Note due by 10/6/2023         "                                              Recert due by 11/4/2023   STG by: 6 weeks Comments Date Status   Improve pain-free R hip ROM to +/- 5 degrees of LLE. Flexion 90, IR 0, Er 30, Ext 5 deg with pain all planes 9/6 ongoing   Improve LEFS score by >/= 9 points.  12/80 9/6 ongoing   Demonstrate compliance and understanding with HEP for improved strength/mobility.       Improve R hip MMT to 4+/5 and pain free.  4-/5 right hip abd limited by severe pain 9/6 ongoing   LTG by: 12 weeks      Improve LEFS score to >/=40/84 12/84 9/6 ongoing   Pt will be able to sit for >/30 mins w/ pain no greater than 3/10. 8-9/10 today 9/6 ongoing   Pt will ascend/descend 5 steps w/o HR and no pain.  Still severe pain; can't lift with right leg 9/6 ongoing   Pt will ambulate 100' or more w/o AD and pain no greater than 1/10.  Has to use crutches due to pain with right WB 9/6 ongoing     Assessment & Plan       Assessment  Impairments: abnormal coordination, abnormal gait, abnormal or restricted ROM, activity intolerance, impaired balance, impaired physical strength, lacks appropriate home exercise program, pain with function and safety issue   Functional limitations: carrying objects, lifting, walking, pushing, uncomfortable because of pain, moving in bed, sitting, standing and stooping   Prognosis: good    Plan  Therapy options: will be seen for skilled therapy services  Planned modality interventions: low level laser therapy, thermotherapy (hydrocollator packs), cryotherapy and dry needling  Planned therapy interventions: abdominal trunk stabilization, ADL retraining, balance/weight-bearing training, body mechanics training, fine motor coordination training, flexibility, functional ROM exercises, gait training, home exercise program, IADL retraining, joint mobilization, therapeutic activities, stretching, strengthening, spinal/joint mobilization, soft tissue mobilization, postural training, neuromuscular re-education, motor  coordination training and manual therapy  Frequency: 2x week  Duration in weeks: 12  Treatment plan discussed with: patient       ASSESSMENT:   He is still in quite a bit of pain. His hip ROM is very limited and his MMT was limited due to pain. He may ultimately need a THR due to his AVN if he doesn't heal or respond to PT. No goals met at this point.    PLAN:   Continue to work on gentle mobility and hip stability.     SIGNATURE: Yannick Liz, [place], [place] License #: [id number]  Electronically Signed on 9/6/2023        [address] [place] [address] [place] [address]  [phone]

## 2023-09-08 ENCOUNTER — TREATMENT (OUTPATIENT)
Dept: PHYSICAL THERAPY | Facility: CLINIC | Age: 53
End: 2023-09-08
Payer: OTHER MISCELLANEOUS

## 2023-09-08 DIAGNOSIS — M25.551 PAIN IN RIGHT HIP: Primary | ICD-10-CM

## 2023-09-08 NOTE — PROGRESS NOTES
Physical Therapy Treatment Note  [address] [place] [address]    Patient: Lee Deal                                                 Visit Date: 2023  :     1970    Referring practitioner:    Abram Jauregui DO  Date of Initial Visit:          Type: THERAPY  Noted: 2023    Patient seen for 4 sessions    Visit Diagnoses:    ICD-10-CM ICD-9-CM   1. Pain in right hip  M25.551 719.45     SUBJECTIVE     Subjective: He feels better after PT.     PAIN: 6.5-7/10 > improved         OBJECTIVE     Objective     Manual Therapy     20331  Comments   HL right hip lateral distraction With gait belt   Supine right LE LAD With gait belt   Timed Minutes 10       Therapeutic Exercises    15057 Units Comments   HL march RLE 2*10 Red TB   BKFO 2*10 B Red TB   SB bridge 3 sec holds 2*10    SB LTR 3 mins    Timed Minutes 35       Therapy Education/Self Care 98097   Education offered today    [employer] Code [email or web address]   Ongoing HEP     Date: 2023  Prepared by: Yannick Liz    Exercises  - Clamshell  - 2 x daily - 7 x weekly - 2 sets - 10 reps  - Sidelying Reverse Clamshell  - 2 x daily - 7 x weekly - 2 sets - 10 reps  - Bent Knee Fallouts with Alternating Legs  - 2 x daily - 7 x weekly - 2 sets - 10 reps     Timed Minutes         GOALS:  Goals                                          Progress Note due by 10/6/2023                                                      Recert due by 2023   STG by: 6 weeks Comments Date Status   Improve pain-free R hip ROM to +/- 5 degrees of LLE. Flexion 90, IR 0, Er 30, Ext 5 deg with pain all planes  ongoing   Improve LEFS score by >/= 9 points.   ongoing   Demonstrate compliance and understanding with HEP for improved strength/mobility.          Improve R hip MMT to 4+/5 and pain free.  4-/5 right hip abd limited by severe pain  ongoing   LTG by: 12 weeks         Improve LEFS score to  >/=40/84 12/84 9/6 ongoing   Pt will be able to sit for >/30 mins w/ pain no greater than 3/10. 8-9/10 today 9/6 ongoing   Pt will ascend/descend 5 steps w/o HR and no pain.  Still severe pain; can't lift with right leg 9/6 ongoing   Pt will ambulate 100' or more w/o AD and pain no greater than 1/10.  Has to use crutches due to pain with right WB 9/6 ongoing       Total Timed Treatment:     45   mins  Total Time of Visit:            45   mins    ASSESSMENT/PLAN     Assessment/Plan     ASSESSMENT: He tolerated active exs w/o increase in pain and reported improved s/s after manual techniques.    PLAN: Continue to work on gentle mobility and hip stability.     SIGNATURE: Edilma Howard, [place], [place] License #: [id number]  Electronically Signed on 9/8/2023        Ezra Barnes  [place] [address]  [phone]

## 2023-09-11 ENCOUNTER — TREATMENT (OUTPATIENT)
Dept: PHYSICAL THERAPY | Facility: CLINIC | Age: 53
End: 2023-09-11
Payer: OTHER MISCELLANEOUS

## 2023-09-11 DIAGNOSIS — M25.551 PAIN IN RIGHT HIP: Primary | ICD-10-CM

## 2023-09-11 PROCEDURE — 97110 THERAPEUTIC EXERCISES: CPT | Performed by: PHYSICAL THERAPIST

## 2023-09-11 PROCEDURE — 97140 MANUAL THERAPY 1/> REGIONS: CPT | Performed by: PHYSICAL THERAPIST

## 2023-09-11 NOTE — PROGRESS NOTES
"                                                                Physical Therapy Treatment Note  [address] [place] [address]    Patient: Lee Deal                                                 Visit Date: 2023  :     1970    Referring practitioner:    Abram Jauregui DO  Date of Initial Visit:          Type: THERAPY  Noted: 2023    Patient seen for 5 sessions    Visit Diagnoses:    ICD-10-CM ICD-9-CM   1. Pain in right hip  M25.551 719.45     SUBJECTIVE     Subjective: He says he had 2 good days after PT but then yesterday his hip was more sore. Today, it's not as bad.     PAIN: 10 > improved       OBJECTIVE     Objective     Manual Therapy     94485  Comments   Percussive IASTM using Powerboost to left hiph flexor and post hip at L2 using ball attachment      HL right hip lateral distraction With gait belt   Supine right LE LAD With gait belt   Timed Minutes 25       Therapeutic Exercises    10041 Units Comments   Sidelying left hip abd SLR 10 x 10\" Moved from neutral to flexion to extension   BKFO 2*10 R active   SB bridge legs over green swiss ball 3 sec holds 2*10    SB LTR 3 mins    Timed Minutes 20       Therapy Education/Self Care 67098   Education offered today    [employer] Code [email or web address]   Ongoing HEP     Date: 2023  Prepared by: Yannick Liz    Exercises  - Clamshell  - 2 x daily - 7 x weekly - 2 sets - 10 reps  - Sidelying Reverse Clamshell  - 2 x daily - 7 x weekly - 2 sets - 10 reps  - Bent Knee Fallouts with Alternating Legs  - 2 x daily - 7 x weekly - 2 sets - 10 reps     Timed Minutes         GOALS:  Goals                                          Progress Note due by 10/6/2023                                                      Recert due by 2023   STG by: 6 weeks Comments Date Status   Improve pain-free R hip ROM to +/- 5 degrees of LLE. Flexion 90, IR 0, Er 30, Ext 5 deg with pain all planes  ongoing   Improve LEFS score by >/= 9 points.   " ongoing   Demonstrate compliance and understanding with HEP for improved strength/mobility.          Improve R hip MMT to 4+/5 and pain free.  4-/5 right hip abd limited by severe pain 9/6 ongoing   LTG by: 12 weeks         Improve LEFS score to >/=40/84 12/84 9/6 ongoing   Pt will be able to sit for >/30 mins w/ pain no greater than 3/10. 6/10 today 9/11 ongoing   Pt will ascend/descend 5 steps w/o HR and no pain.  Still severe pain; can't lift with right leg 9/6 ongoing   Pt will ambulate 100' or more w/o AD and pain no greater than 1/10.  Has to use crutches due to pain with right WB 9/6 ongoing       Total Timed Treatment:     45   mins  Total Time of Visit:            45   mins    ASSESSMENT/PLAN     Assessment/Plan     ASSESSMENT: He felt much better after PT and could put more weight on his right leg. We still don't know how far his hip may heal and allow him to walk without a THR but I'm encouraged so far that at least temporarily, he can get some relief.     PLAN: Continue to work on gentle mobility and hip stability.     SIGNATURE: Yannick Liz, PT, [place] License #: [id number]  Electronically Signed on 9/11/2023        [address] [place] [address]  [phone]

## 2023-09-13 ENCOUNTER — TREATMENT (OUTPATIENT)
Dept: PHYSICAL THERAPY | Facility: CLINIC | Age: 53
End: 2023-09-13
Payer: COMMERCIAL

## 2023-09-13 DIAGNOSIS — M25.551 PAIN IN RIGHT HIP: Primary | ICD-10-CM

## 2023-09-13 DIAGNOSIS — S39.012S STRAIN OF LUMBAR REGION, SEQUELA: ICD-10-CM

## 2023-09-13 PROCEDURE — 97140 MANUAL THERAPY 1/> REGIONS: CPT | Performed by: PHYSICAL THERAPIST

## 2023-09-13 NOTE — PROGRESS NOTES
Physical Therapy Treatment Note  [address] [place] [address]    Patient: Lee Deal                                                 Visit Date: 2023  :     1970    Referring practitioner:    Abram Jauregui DO  Date of Initial Visit:          Type: THERAPY  Noted: 2023    Patient seen for 6 sessions    Visit Diagnoses:    ICD-10-CM ICD-9-CM   1. Pain in right hip  M25.551 719.45   2. Strain of lumbar region, sequela  S39.012S 905.7     SUBJECTIVE     Subjective:He reports everything we do helps just doesn't last his pain seems to be worse today. He reports R hip pain.       PAIN: 10/10         OBJECTIVE     Objective     Manual Therapy     [id number]  Comments   Percussive IASTM using Powerboost to left hiph flexor and post hip at L2 using ball attachment     R hip inferior lateral glide Grade 3 sustained    R LE LAD Grade 3 sustained            Timed Minutes 40           Therapy Education/Self Care 18282   Education offered today    [email or web address]    Ongoing HEP   Date: 2023  Prepared by: Yannick Liz     Exercises  - Clamshell  - 2 x daily - 7 x weekly - 2 sets - 10 reps  - Sidelying Reverse Clamshell  - 2 x daily - 7 x weekly - 2 sets - 10 reps  - Bent Knee Fallouts with Alternating Legs  - 2 x daily - 7 x weekly - 2 sets - 10 reps      Timed Minutes        Total Timed Treatment:     40   mins  Total Time of Visit:             40   mins         ASSESSMENT/PLAN     GOALS  Goals                                          Progress Note due by 10/6/2023                                                      Recert due by 2023   STG by: 6 weeks Comments Date Status   Improve pain-free R hip ROM to +/- 5 degrees of LLE. Flexion 90, IR 0, Er 30, Ext 5 deg with pain all planes  ongoing   Improve LEFS score by >/= 9 points.   ongoing   Demonstrate compliance and understanding with HEP for improved  strength/mobility.          Improve R hip MMT to 4+/5 and pain free.  4-/5 right hip abd limited by severe pain 9/6 ongoing   LTG by: 12 weeks         Improve LEFS score to >/=40/84 12/84 9/6 ongoing   Pt will be able to sit for >/30 mins w/ pain no greater than 3/10. 6/10 today 9/11 ongoing   Pt will ascend/descend 5 steps w/o HR and no pain.  Still severe pain; can't lift with right leg 9/6 ongoing   Pt will ambulate 100' or more w/o AD and pain no greater than 1/10.  Has to use crutches due to pain with right WB 9/6 ongoing       Assessment/Plan     ASSESSMENT:   Due to his subjective report and pain rating I avoided therex today.    PLAN:   Continue to work on gentle mobility and hip stability.        SIGNATURE: Ron Hurt [facility], [place] License #: [id number]  Electronically Signed on 9/13/2023        [address] [place] [address] [place] [address]  [phone]

## 2023-09-18 ENCOUNTER — TREATMENT (OUTPATIENT)
Dept: PHYSICAL THERAPY | Facility: CLINIC | Age: 53
End: 2023-09-18
Payer: OTHER MISCELLANEOUS

## 2023-09-18 DIAGNOSIS — M25.551 PAIN IN RIGHT HIP: Primary | ICD-10-CM

## 2023-09-18 NOTE — PROGRESS NOTES
Physical Therapy Treatment Note  [address] [place] [address]    Patient: eLe Deal                                                 Visit Date: 2023  :     1970    Referring practitioner:    Abram Jauregui DO  Date of Initial Visit:          Type: THERAPY  Noted: 2023    Patient seen for 7 sessions    Visit Diagnoses:    ICD-10-CM ICD-9-CM   1. Pain in right hip  M25.551 719.45     SUBJECTIVE     Subjective: He reports everything we do helps just doesn't last his pain seems to be worse today. He reports R hip pain. He got a steroid injection Friday which seems to be helping his pain.      PAIN: 4.5-5/10 > unchanged         OBJECTIVE     Objective     Manual Therapy     [id number]  Comments   Hip flexor stretch RLE    R LE LAD Grade 3 sustained + oscillations    Timed Minutes 8     Therapeutic Exercises    29698 Units Comments   Bridge + hip ABD 2*10 Red TB   SL hip ABD bent knee hip ext 2*10    Prone bent knee hip ext RLE 2*10    Attempted STS from elevated table, D/C's d/t pain 2    Prone reverse clamshell against resistance  2*10 B Red TB   Timed Minutes 37          Therapy Education/Self Care 28034   Education offered today    [email or web address]    Ongoing HEP     Date: 2023  Prepared by: Edilma Howard    Exercises  - Clamshell  - 1 x daily - 4 x weekly - 2-3 sets - 10 reps  - Sidelying Reverse Clamshell  - 1 x daily - 4 x weekly - 2-3 sets - 10 reps  - Bent Knee Fallouts with Alternating Legs  - 1 x daily - 4 x weekly - 2-3 sets - 10 reps  - Bridge with Hip Abduction and Resistance - Ground Touches  - 1 x daily - 4 x weekly - 2-3 sets - 10 reps  - Sidelying Hip Extension in Abduction with Knee Bent  - 1 x daily - 4 x weekly - 2-3 sets - 10 reps  - Modified Yassine Stretch  - 3 x daily - 4 x weekly - 2 reps - 30 sec hold      Timed Minutes        Total Timed Treatment:     45   mins  Total Time of Visit:              45   mins         ASSESSMENT/PLAN     GOALS  Goals                                          Progress Note due by 10/6/2023                                                      Recert due by 11/4/2023   STG by: 6 weeks Comments Date Status   Improve pain-free R hip ROM to +/- 5 degrees of LLE. Flexion 90, IR 0, Er 30, Ext 5 deg with pain all planes 9/6 ongoing   Improve LEFS score by >/= 9 points.  12/80 9/6 ongoing   Demonstrate compliance and understanding with HEP for improved strength/mobility.          Improve R hip MMT to 4+/5 and pain free.  4-/5 right hip abd limited by severe pain 9/6 ongoing   LTG by: 12 weeks         Improve LEFS score to >/=40/84 12/84 9/6 ongoing   Pt will be able to sit for >/30 mins w/ pain no greater than 3/10. 6/10 today 9/11 ongoing   Pt will ascend/descend 5 steps w/o HR and no pain.  Still severe pain; can't lift with right leg 9/6 ongoing   Pt will ambulate 100' or more w/o AD and pain no greater than 1/10.  Has to use crutches due to pain with right WB 9/6 ongoing       Assessment/Plan     ASSESSMENT: His activity tolerance was much improved today likely attributable in part d/t recent steroid injection in R hip. Bolstered HEP for hip strengthening while he is able to tolerate more active approach to improve hip strength and stability.     PLAN: Consider asking for more visits vs D/C to pursue next steps in POC after last authorized visit on 9/25/2023. Continue to work on gentle mobility and hip stability.      SIGNATURE: Edilma Howard PT, [place] License #: [id number]  Electronically Signed on 9/18/2023        [address] [address]  [phone]

## 2023-09-20 ENCOUNTER — TREATMENT (OUTPATIENT)
Dept: PHYSICAL THERAPY | Facility: CLINIC | Age: 53
End: 2023-09-20
Payer: OTHER MISCELLANEOUS

## 2023-09-20 DIAGNOSIS — M25.551 PAIN IN RIGHT HIP: Primary | ICD-10-CM

## 2023-09-20 NOTE — PROGRESS NOTES
Physical Therapy Treatment Note  [address] [place] [address]    Patient: Lee Deal                                                 Visit Date: 2023  :     1970    Referring practitioner:    Abram Jauregui DO  Date of Initial Visit:          Type: THERAPY  Noted: 2023    Patient seen for 8 sessions    Visit Diagnoses:    ICD-10-CM ICD-9-CM   1. Pain in right hip  M25.551 719.45     SUBJECTIVE     Subjective: He felt pretty good after last time, it just doesn't stay that way.     PAIN: 6-7/10 > IMPROVED         OBJECTIVE     Objective     Manual Therapy     80291  Comments   Hip flexor stretch RLE 1 min *2   Inferolateral hip glide grade 3 sustained via gait belt 4 mins   Timed Minutes 8     Therapeutic Exercises    07131 Units Comments   Bridge + hip ADD 2*10    Bridge + hip ABD 2*10 Red TB   SL hip ABD bent knee hip ext 2*10    Prone bent knee hip ext RLE 2*10    Prone HS curls against resistance 2*10 Red TB   Prone reverse clamshell against resistance  2*10 B Red TB   Timed Minutes 37          Therapy Education/Self Care 93992   Education offered today    [employer] Code [email or web address]    Ongoing HEP     Date: 2023  Prepared by: Edilma Howard    Exercises  - Clamshell  - 1 x daily - 4 x weekly - 2-3 sets - 10 reps  - Sidelying Reverse Clamshell  - 1 x daily - 4 x weekly - 2-3 sets - 10 reps  - Bent Knee Fallouts with Alternating Legs  - 1 x daily - 4 x weekly - 2-3 sets - 10 reps  - Bridge with Hip Abduction and Resistance - Ground Touches  - 1 x daily - 4 x weekly - 2-3 sets - 10 reps  - Sidelying Hip Extension in Abduction with Knee Bent  - 1 x daily - 4 x weekly - 2-3 sets - 10 reps  - Modified Yassine Stretch  - 3 x daily - 4 x weekly - 2 reps - 30 sec hold      Timed Minutes        Total Timed Treatment:     45   mins  Total Time of Visit:             45   mins         ASSESSMENT/PLAN     GOALS  Goals                                           Progress Note due by 10/6/2023                                                      Recert due by 11/4/2023   STG by: 6 weeks Comments Date Status   Improve pain-free R hip ROM to +/- 5 degrees of LLE. Flexion 90, IR 0, Er 30, Ext 5 deg with pain all planes 9/6 ongoing   Improve LEFS score by >/= 9 points.  12/80 9/6 ongoing   Demonstrate compliance and understanding with HEP for improved strength/mobility.          Improve R hip MMT to 4+/5 and pain free.  4-/5 right hip abd limited by severe pain 9/6 ongoing   LTG by: 12 weeks         Improve LEFS score to >/=40/84 12/84 9/6 ongoing   Pt will be able to sit for >/30 mins w/ pain no greater than 3/10. 6/10 today 9/11 ongoing   Pt will ascend/descend 5 steps w/o HR and no pain.  Still severe pain; can't lift with right leg 9/6 ongoing   Pt will ambulate 100' or more w/o AD and pain no greater than 1/10.  Has to use crutches due to pain with right WB 9/6 ongoing       Assessment/Plan     ASSESSMENT: Continued improvement in activity tolerance likely attributable in part d/t recent steroid injection in R hip. His pain was improved upon return to upright partial Wbing w/ B crutches post-intervention which is an improvement compared to immediate return of R hip pain in previous sessions before receiving the injection.     PLAN: Consider asking for more visits vs D/C to pursue next steps in POC after last authorized visit on 9/25/2023. Continue to work on gentle mobility and hip stability.      SIGNATURE: Edilma Howard, PT, [place] License #: [id number]  Electronically Signed on 9/20/2023        [place] [address]  [phone]

## 2023-09-25 ENCOUNTER — TREATMENT (OUTPATIENT)
Dept: PHYSICAL THERAPY | Facility: CLINIC | Age: 53
End: 2023-09-25

## 2023-09-25 DIAGNOSIS — M25.551 PAIN IN RIGHT HIP: Primary | ICD-10-CM

## 2023-09-25 NOTE — PROGRESS NOTES
Physical Therapy Treatment Note  [address] [place] [address]    Patient: Lee Deal                                                 Visit Date: 2023  :     1970    Referring practitioner:    Abram Jauregui DO  Date of Initial Visit:          Type: THERAPY  Noted: 2023    Patient seen for 9 sessions    Visit Diagnoses:    ICD-10-CM ICD-9-CM   1. Pain in right hip  M25.551 719.45     SUBJECTIVE     Subjective: He felt pretty good after last time, it just doesn't stay that way. He had a bad episode Saturday where it was throbbing, thinks he could have over done it w/ his HEP on Friday. He presents using B crutches as he has throughout POC.     PAIN: 4-5/10 > IMPROVED         OBJECTIVE     Objective     Gait Training          77082   Task/Terrain Asst AD Comments   Part practice of A/P weight shift, feet staggered  none Mirror for visual feedback   Part practice of A/P weight shift, feet staggered + one step  none Mirror for visual feedback   Gait training  none Mirror for visual feedback, much improved RLE Wbing and less excessive lateral excursion   Timed Minutes 15        Therapeutic Exercises    33436 Units Comments   Resisted side stepping limiting UE support/a 5 laps B Yellow TB hip ABD, red TB hip ADD   Lateral and forward theradisc lunges 10 B e    STS using scales to get equal WB  8 D/C'd d/t R knee pain   Timed Minutes 30          Therapy Education/Self Care 51068   Education offered today    Ean Code [id number]    Ongoing HEP     Date: 2023  Prepared by: Edilma Howard    Exercises  - Clamshell  - 1 x daily - 4 x weekly - 2-3 sets - 10 reps  - Sidelying Reverse Clamshell  - 1 x daily - 4 x weekly - 2-3 sets - 10 reps  - Bent Knee Fallouts with Alternating Legs  - 1 x daily - 4 x weekly - 2-3 sets - 10 reps  - Bridge with Hip Abduction and Resistance - Ground Touches  - 1 x daily - 4 x weekly - 2-3 sets - 10  reps  - Sidelying Hip Extension in Abduction with Knee Bent  - 1 x daily - 4 x weekly - 2-3 sets - 10 reps  - Modified Yassine Stretch  - 3 x daily - 4 x weekly - 2 reps - 30 sec hold      Timed Minutes        Total Timed Treatment:     45   mins  Total Time of Visit:             45   mins         ASSESSMENT/PLAN     GOALS  Goals                                          Progress Note due by 10/6/2023                                                      Recert due by 11/4/2023   STG by: 6 weeks Comments Date Status   Improve pain-free R hip ROM to +/- 5 degrees of LLE. Flexion 90, IR 0, Er 30, Ext 5 deg with pain all planes 9/6 ongoing   Improve LEFS score by >/= 9 points.  12/80 9/6 ongoing   Demonstrate compliance and understanding with HEP for improved strength/mobility.     9/25  MET   Improve R hip MMT to 4+/5 and pain free.  4-/5 right hip abd limited by severe pain 9/6 ongoing   LTG by: 12 weeks         Improve LEFS score to >/=40/84 12/84 9/6 ongoing   Pt will be able to sit for >/30 mins w/ pain no greater than 3/10. 6/10 today 9/11 ongoing   Pt will ascend/descend 5 steps w/o HR and no pain.  Still severe pain; can't lift with right leg 9/6 ongoing   Pt will ambulate 100' or more w/o AD and pain no greater than 1/10.  Has to use crutches due to pain with right WB 9/25 ongoing       Assessment/Plan     ASSESSMENT: Today was the first session pt was able to tolerate standing hip strengthening activities as well as short bouts of gait training w/o AD or UE support/a. Advised him to add this pre-gait activity w/o AD to HEP (small bouts in home only per his tolerance) to reinforce proper gait mechanics while he is still experiencing relief from steroid shot. He demo'd good carryover toward end of session though did exhibit LE fatigue which will continue to be addressed in subsequent sessions.     PLAN: Consider asking for more visits vs D/C to pursue next steps in POC after last authorized visit on 9/25/2023.  Continue to work on gentle mobility and hip stability.      SIGNATURE: Edilma Howard, PT, [place] License #: [id number]  Electronically Signed on 9/25/2023        [address]  [place] [address]  [phone]

## 2023-12-05 ENCOUNTER — HOSPITAL ENCOUNTER (OUTPATIENT)
Dept: NON INVASIVE DIAGNOSTICS | Age: 53
Discharge: HOME | End: 2023-12-05
Payer: COMMERCIAL

## 2023-12-05 DIAGNOSIS — I10 BENIGN HYPERTENSION: ICD-10-CM

## 2023-12-05 DIAGNOSIS — Z01.812 PRE-OPERATIVE LABORATORY EXAMINATION: ICD-10-CM

## 2023-12-05 DIAGNOSIS — Z01.810 PRE-OPERATIVE CARDIOVASCULAR EXAMINATION: ICD-10-CM

## 2023-12-05 DIAGNOSIS — E78.5 HYPERLIPIDEMIA, UNSPECIFIED HYPERLIPIDEMIA TYPE: ICD-10-CM

## 2023-12-05 LAB
ALBUMIN SERPL-MCNC: 4.6 G/DL (ref 3.5–5.2)
ALP SERPL-CCNC: 62 U/L (ref 40–130)
ALT SERPL-CCNC: 54 U/L (ref 5–41)
ANION GAP SERPL CALCULATED.3IONS-SCNC: 8 MMOL/L (ref 7–19)
APTT PPP: 26.6 SEC (ref 26–36.2)
AST SERPL-CCNC: 37 U/L (ref 5–40)
BASOPHILS # BLD: 0.1 K/UL (ref 0–0.2)
BASOPHILS NFR BLD: 1.2 % (ref 0–1)
BILIRUB SERPL-MCNC: 0.6 MG/DL (ref 0.2–1.2)
BILIRUB UR QL STRIP: NEGATIVE
BUN SERPL-MCNC: 14 MG/DL (ref 6–20)
CALCIUM SERPL-MCNC: 9.9 MG/DL (ref 8.6–10)
CHLORIDE SERPL-SCNC: 105 MMOL/L (ref 98–111)
CLARITY UR: CLEAR
CO2 SERPL-SCNC: 30 MMOL/L (ref 22–29)
COLOR UR: YELLOW
CREAT SERPL-MCNC: 1.1 MG/DL (ref 0.5–1.2)
EKG P AXIS: 44 DEGREES
EKG P-R INTERVAL: 172 MS
EKG Q-T INTERVAL: 356 MS
EKG QRS DURATION: 76 MS
EKG QTC CALCULATION (BAZETT): 371 MS
EKG T AXIS: 47 DEGREES
EOSINOPHIL # BLD: 0.4 K/UL (ref 0–0.6)
EOSINOPHIL NFR BLD: 9.1 % (ref 0–5)
ERYTHROCYTE [DISTWIDTH] IN BLOOD BY AUTOMATED COUNT: 12.1 % (ref 11.5–14.5)
GLUCOSE SERPL-MCNC: 87 MG/DL (ref 74–109)
GLUCOSE UR STRIP.AUTO-MCNC: NEGATIVE MG/DL
HCT VFR BLD AUTO: 39.8 % (ref 42–52)
HGB BLD-MCNC: 12.3 G/DL (ref 14–18)
HGB UR STRIP.AUTO-MCNC: NEGATIVE MG/L
IMM GRANULOCYTES # BLD: 0 K/UL
KETONES UR STRIP.AUTO-MCNC: NEGATIVE MG/DL
LEUKOCYTE ESTERASE UR QL STRIP.AUTO: NEGATIVE
LYMPHOCYTES # BLD: 1.6 K/UL (ref 1.1–4.5)
LYMPHOCYTES NFR BLD: 36.8 % (ref 20–40)
MCH RBC QN AUTO: 28.3 PG (ref 27–31)
MCHC RBC AUTO-ENTMCNC: 30.9 G/DL (ref 33–37)
MCV RBC AUTO: 91.7 FL (ref 80–94)
MONOCYTES # BLD: 0.3 K/UL (ref 0–0.9)
MONOCYTES NFR BLD: 7.2 % (ref 0–10)
NEUTROPHILS # BLD: 1.9 K/UL (ref 1.5–7.5)
NEUTS SEG NFR BLD: 45.2 % (ref 50–65)
NITRITE UR QL STRIP.AUTO: NEGATIVE
PH UR STRIP.AUTO: 5.5 [PH] (ref 5–8)
PLATELET # BLD AUTO: 193 K/UL (ref 130–400)
PMV BLD AUTO: 11.9 FL (ref 9.4–12.4)
POTASSIUM SERPL-SCNC: 3.8 MMOL/L (ref 3.5–5)
PROT SERPL-MCNC: 7.3 G/DL (ref 6.6–8.7)
PROT UR STRIP.AUTO-MCNC: NEGATIVE MG/DL
RBC # BLD AUTO: 4.34 M/UL (ref 4.7–6.1)
SODIUM SERPL-SCNC: 143 MMOL/L (ref 136–145)
SP GR UR STRIP.AUTO: 1.03 (ref 1–1.03)
UROBILINOGEN UR STRIP.AUTO-MCNC: 0.2 E.U./DL
WBC # BLD AUTO: 4.3 K/UL (ref 4.8–10.8)

## 2023-12-05 PROCEDURE — 93010 ELECTROCARDIOGRAM REPORT: CPT | Performed by: INTERNAL MEDICINE

## 2023-12-05 PROCEDURE — 93005 ELECTROCARDIOGRAM TRACING: CPT

## 2023-12-06 LAB — MRSA DNA SPEC QL NAA+PROBE: NOT DETECTED

## 2024-02-24 ENCOUNTER — OFFICE VISIT (OUTPATIENT)
Age: 54
End: 2024-02-24

## 2024-02-24 VITALS
HEIGHT: 74 IN | SYSTOLIC BLOOD PRESSURE: 130 MMHG | HEART RATE: 76 BPM | RESPIRATION RATE: 20 BRPM | DIASTOLIC BLOOD PRESSURE: 82 MMHG | WEIGHT: 315 LBS | TEMPERATURE: 97.4 F | OXYGEN SATURATION: 96 % | BODY MASS INDEX: 40.43 KG/M2

## 2024-02-24 DIAGNOSIS — H61.23 BILATERAL IMPACTED CERUMEN: Primary | ICD-10-CM

## 2024-02-24 DIAGNOSIS — H92.02 ACUTE OTALGIA, LEFT: ICD-10-CM

## 2024-02-24 ASSESSMENT — ENCOUNTER SYMPTOMS
VOMITING: 0
WHEEZING: 0
SINUS PAIN: 0
ALLERGIC/IMMUNOLOGIC NEGATIVE: 1
NAUSEA: 0
CHEST TIGHTNESS: 0
CONSTIPATION: 0
ABDOMINAL PAIN: 0
COUGH: 0
EYE REDNESS: 0
EYE ITCHING: 0
SORE THROAT: 0
RHINORRHEA: 0
SHORTNESS OF BREATH: 0
BACK PAIN: 0
DIARRHEA: 0
EYE DISCHARGE: 0

## 2024-02-24 NOTE — PATIENT INSTRUCTIONS
Use debrox daily to prevent ear wax build up  2. Refrain from using Q-Tips in the ears  3. The patient is to follow up with PCP or return to clinic if symptoms worsen/fail to improve.

## 2024-02-24 NOTE — PROGRESS NOTES
[facility]  [facility]  [address] [place] [address] [address]IVE  [place] [address]  Dept: [phone]  Dept Fax: [phone]  Loc: [phone]    Phillip Valerio is a 53 y.o. male who presents today for his medical conditions/complaints as noted below.  Phillip Valerio is complaining of Otalgia (Pressure and fullness and hear out of it/1 week)      HPI:     Phillip Valerio presents to clinic for evaluation of left otalgia x1 week. Reports he can only hear 30-40% out of left ear and pressure sensation. Denies any past trauma or injury to left ear. Denies any other symptoms. Afebrile. Stable.      Otalgia   There is pain in both ears. This is a new problem. The current episode started in the past 7 days. The problem has been unchanged. There has been no fever. The patient is experiencing no pain. Associated symptoms include headaches and hearing loss. Pertinent negatives include no abdominal pain, coughing, diarrhea, ear discharge, neck pain, rhinorrhea, sore throat or vomiting. He has tried nothing for the symptoms. There is no history of a chronic ear infection or hearing loss.       Past Medical History:   Diagnosis Date    Arthritis     Depression     Gout     Hypertension     Vitamin D deficiency        Past Surgical History:   Procedure Laterality Date    APPENDECTOMY      JOINT REPLACEMENT Left     hip    TOTAL HIP ARTHROPLASTY  12/28/2023    Dr Koenig       No family history on file.    Social History     Tobacco Use    Smoking status: Former    Smokeless tobacco: Never   Substance Use Topics    Alcohol use: Yes     Alcohol/week: 7.0 standard drinks of alcohol     Types: 7 Cans of beer per week     Comment: occasional        Current Outpatient Medications   Medication Sig Dispense Refill    losartan (COZAAR) 25 MG tablet Take 1 tablet by mouth daily      allopurinol (ZYLOPRIM) 100 MG tablet       rosuvastatin (CRESTOR) 5 MG tablet       amLODIPine (NORVASC) 5 MG tablet Take 1 tablet

## 2025-02-15 ENCOUNTER — APPOINTMENT (OUTPATIENT)
Dept: GENERAL RADIOLOGY | Age: 55
End: 2025-02-15
Payer: MEDICAID

## 2025-02-15 ENCOUNTER — HOSPITAL ENCOUNTER (EMERGENCY)
Age: 55
Discharge: HOME | End: 2025-02-15
Attending: STUDENT IN AN ORGANIZED HEALTH CARE EDUCATION/TRAINING PROGRAM
Payer: MEDICAID

## 2025-02-15 VITALS
TEMPERATURE: 98.6 F | DIASTOLIC BLOOD PRESSURE: 97 MMHG | OXYGEN SATURATION: 96 % | HEART RATE: 73 BPM | BODY MASS INDEX: 40.43 KG/M2 | HEIGHT: 74 IN | SYSTOLIC BLOOD PRESSURE: 152 MMHG | RESPIRATION RATE: 13 BRPM | WEIGHT: 315 LBS

## 2025-02-15 DIAGNOSIS — J02.0 STREP PHARYNGITIS: Primary | ICD-10-CM

## 2025-02-15 LAB
ANION GAP SERPL CALCULATED.3IONS-SCNC: 12 MMOL/L (ref 8–16)
B PARAP IS1001 DNA NPH QL NAA+NON-PROBE: NOT DETECTED
B PERT.PT PRMT NPH QL NAA+NON-PROBE: NOT DETECTED
BASOPHILS # BLD: 0.1 K/UL (ref 0–0.2)
BASOPHILS NFR BLD: 1.4 % (ref 0–1)
BUN SERPL-MCNC: 16 MG/DL (ref 6–20)
C PNEUM DNA NPH QL NAA+NON-PROBE: NOT DETECTED
CALCIUM SERPL-MCNC: 9.6 MG/DL (ref 8.6–10)
CHLORIDE SERPL-SCNC: 105 MMOL/L (ref 98–107)
CO2 SERPL-SCNC: 26 MMOL/L (ref 22–29)
CREAT SERPL-MCNC: 1.3 MG/DL (ref 0.7–1.2)
D DIMER PPP FEU-MCNC: 0.42 UG/ML FEU (ref 0–0.48)
EOSINOPHIL # BLD: 0.6 K/UL (ref 0–0.6)
EOSINOPHIL NFR BLD: 6.9 % (ref 0–5)
ERYTHROCYTE [DISTWIDTH] IN BLOOD BY AUTOMATED COUNT: 12.5 % (ref 11.5–14.5)
FLUAV RNA NPH QL NAA+NON-PROBE: NOT DETECTED
FLUBV RNA NPH QL NAA+NON-PROBE: NOT DETECTED
GLUCOSE SERPL-MCNC: 109 MG/DL (ref 70–99)
HADV DNA NPH QL NAA+NON-PROBE: NOT DETECTED
HCOV 229E RNA NPH QL NAA+NON-PROBE: NOT DETECTED
HCOV HKU1 RNA NPH QL NAA+NON-PROBE: NOT DETECTED
HCOV NL63 RNA NPH QL NAA+NON-PROBE: NOT DETECTED
HCOV OC43 RNA NPH QL NAA+NON-PROBE: NOT DETECTED
HCT VFR BLD AUTO: 40 % (ref 42–52)
HGB BLD-MCNC: 12.5 G/DL (ref 14–18)
HMPV RNA NPH QL NAA+NON-PROBE: NOT DETECTED
HPIV1 RNA NPH QL NAA+NON-PROBE: NOT DETECTED
HPIV2 RNA NPH QL NAA+NON-PROBE: NOT DETECTED
HPIV3 RNA NPH QL NAA+NON-PROBE: NOT DETECTED
HPIV4 RNA NPH QL NAA+NON-PROBE: NOT DETECTED
IMM GRANULOCYTES # BLD: 0.5 K/UL
LYMPHOCYTES # BLD: 1.5 K/UL (ref 1.1–4.5)
LYMPHOCYTES NFR BLD: 16.6 % (ref 20–40)
M PNEUMO DNA NPH QL NAA+NON-PROBE: NOT DETECTED
MCH RBC QN AUTO: 28.7 PG (ref 27–31)
MCHC RBC AUTO-ENTMCNC: 31.3 G/DL (ref 33–37)
MCV RBC AUTO: 91.7 FL (ref 80–94)
MONOCYTES # BLD: 0.7 K/UL (ref 0–0.9)
MONOCYTES NFR BLD: 8 % (ref 0–10)
NEUTROPHILS # BLD: 5.4 K/UL (ref 1.5–7.5)
NEUTS SEG NFR BLD: 61.8 % (ref 50–65)
PLATELET # BLD AUTO: 221 K/UL (ref 130–400)
PMV BLD AUTO: 11.4 FL (ref 9.4–12.4)
POTASSIUM SERPL-SCNC: 4.4 MMOL/L (ref 3.5–5.1)
RBC # BLD AUTO: 4.36 M/UL (ref 4.7–6.1)
RSV RNA NPH QL NAA+NON-PROBE: NOT DETECTED
RV+EV RNA NPH QL NAA+NON-PROBE: NOT DETECTED
S PYO AG THROAT QL: POSITIVE
SARS-COV-2 RNA NPH QL NAA+NON-PROBE: NOT DETECTED
SODIUM SERPL-SCNC: 143 MMOL/L (ref 136–145)
TROPONIN, HIGH SENSITIVITY: 10 NG/L (ref 0–22)
WBC # BLD AUTO: 8.8 K/UL (ref 4.8–10.8)

## 2025-02-15 PROCEDURE — 87880 STREP A ASSAY W/OPTIC: CPT

## 2025-02-15 PROCEDURE — 99285 EMERGENCY DEPT VISIT HI MDM: CPT

## 2025-02-15 PROCEDURE — 93005 ELECTROCARDIOGRAM TRACING: CPT | Performed by: STUDENT IN AN ORGANIZED HEALTH CARE EDUCATION/TRAINING PROGRAM

## 2025-02-15 PROCEDURE — 85379 FIBRIN DEGRADATION QUANT: CPT

## 2025-02-15 PROCEDURE — 0202U NFCT DS 22 TRGT SARS-COV-2: CPT

## 2025-02-15 PROCEDURE — 36415 COLL VENOUS BLD VENIPUNCTURE: CPT

## 2025-02-15 PROCEDURE — 85025 COMPLETE CBC W/AUTO DIFF WBC: CPT

## 2025-02-15 PROCEDURE — 71046 X-RAY EXAM CHEST 2 VIEWS: CPT

## 2025-02-15 PROCEDURE — 80048 BASIC METABOLIC PNL TOTAL CA: CPT

## 2025-02-15 PROCEDURE — 84484 ASSAY OF TROPONIN QUANT: CPT

## 2025-02-15 RX ORDER — AMOXICILLIN 875 MG/1
875 TABLET, COATED ORAL 2 TIMES DAILY
Qty: 20 TABLET | Refills: 0 | Status: SHIPPED | OUTPATIENT
Start: 2025-02-15 | End: 2025-02-25

## 2025-02-15 ASSESSMENT — ENCOUNTER SYMPTOMS
NAUSEA: 0
SHORTNESS OF BREATH: 1
VOMITING: 0

## 2025-02-15 NOTE — DISCHARGE INSTRUCTIONS
Return to the emergency department if you experience neck swelling, difficulty breathing, persistent fevers will go away, or other signs of an emergent process like an abscess.  Otherwise take the amoxicillin as prescribed and follow-up with your doctor.  Your creatinine level was 1.3 today which is very slightly abnormal I would like you to make sure you plenty of water and follow-up with your doctor about this

## 2025-02-15 NOTE — ED PROVIDER NOTES
[facility] EMERGENCY DEPARTMENT  eMERGENCY dEPARTMENT eNCOUnter      Pt Name: Phillip Valerio  MRN: [id number]  Birthdate 1970  Date of evaluation: 2/15/2025  Provider: Felix Garcia MD    Chief Complaint:  Chief Complaint   Patient presents with    Cough    Shortness of Breath     X10 days     HPI    Phillip Valerio is a 54 y.o. male who presents to the emergency department with sore throat. Present for a week. In the back left side of his throat and last 3-4 days it moves down into his chest. At night when he lies down to sleep he coughs and has pain and becomes uncomfortable. It feels like sharp pains when he coughs but he only experiences the pain when he coughs otherwise no pain. He has also had wheezing when he lies down at night. No h/o asthma. Had a fever when this started for a day or 2 and it has gone away. Not coughing anything up. Sick contacts unknown.  PMH htn reflux arthritis gout. SHOB only occurs with coughing.     NursingNotes were reviewed.    Review of Systems   Constitutional:  Negative for fever.   Respiratory:  Positive for shortness of breath.    Cardiovascular:  Positive for chest pain. Negative for leg swelling.   Gastrointestinal:  Negative for nausea and vomiting.   Genitourinary:  Negative for difficulty urinating and dysuria.   Neurological:  Negative for syncope.       Past Medical History:   Diagnosis Date    Arthritis     Depression     Gout     Hypertension     Vitamin D deficiency        Past Surgical History:   Procedure Laterality Date    APPENDECTOMY      JOINT REPLACEMENT Left     hip    TOTAL HIP ARTHROPLASTY  12/28/2023    Dr Koenig       Discharge Medication List as of 2/15/2025  2:48 PM        CONTINUE these medications which have NOT CHANGED    Details   losartan (COZAAR) 25 MG tablet Take 1 tablet by mouth dailyHistorical Med      allopurinol (ZYLOPRIM) 100 MG tablet Historical Med      rosuvastatin (CRESTOR) 5 MG tablet Historical Med      amLODIPine (NORVASC) 5 MG

## 2025-02-16 LAB
EKG P AXIS: 38 DEGREES
EKG P-R INTERVAL: 160 MS
EKG Q-T INTERVAL: 358 MS
EKG QRS DURATION: 74 MS
EKG QTC CALCULATION (BAZETT): 396 MS
EKG T AXIS: 71 DEGREES

## 2025-03-05 ENCOUNTER — TELEPHONE (OUTPATIENT)
Age: 55
End: 2025-03-05

## 2025-03-05 NOTE — TELEPHONE ENCOUNTER
Thelma Tripp [facility] [employer]  called today to check status will send out records tomorrow need 2 sets of CD's  8/17/23,10/18/23 & 11/29/23  LB  her Ph # [phone]

## 2025-03-06 ENCOUNTER — TELEPHONE (OUTPATIENT)
Age: 55
End: 2025-03-06

## 2025-03-18 ENCOUNTER — APPOINTMENT (OUTPATIENT)
Dept: CT IMAGING | Age: 55
DRG: 690 | End: 2025-03-18
Payer: MEDICAID

## 2025-03-18 ENCOUNTER — APPOINTMENT (OUTPATIENT)
Dept: GENERAL RADIOLOGY | Age: 55
DRG: 690 | End: 2025-03-18
Payer: MEDICAID

## 2025-03-18 ENCOUNTER — HOSPITAL ENCOUNTER (INPATIENT)
Age: 55
LOS: 2 days | Discharge: HOME | DRG: 690 | End: 2025-03-20
Attending: EMERGENCY MEDICINE | Admitting: INTERNAL MEDICINE
Payer: MEDICAID

## 2025-03-18 DIAGNOSIS — N12 PYELONEPHRITIS: ICD-10-CM

## 2025-03-18 DIAGNOSIS — N17.9 AKI (ACUTE KIDNEY INJURY): Primary | ICD-10-CM

## 2025-03-18 DIAGNOSIS — Z92.89: ICD-10-CM

## 2025-03-18 DIAGNOSIS — R51.9 NONINTRACTABLE HEADACHE, UNSPECIFIED CHRONICITY PATTERN, UNSPECIFIED HEADACHE TYPE: ICD-10-CM

## 2025-03-18 PROBLEM — B95.0 STREPTOCOCCUS INFECTION, GROUP A: Status: ACTIVE | Noted: 2025-03-18

## 2025-03-18 PROBLEM — N39.0 URINARY TRACT INFECTION: Status: ACTIVE | Noted: 2025-03-18

## 2025-03-18 PROBLEM — K21.9 GERD (GASTROESOPHAGEAL REFLUX DISEASE): Status: ACTIVE | Noted: 2025-03-18

## 2025-03-18 PROBLEM — E78.5 HYPERLIPIDEMIA: Status: ACTIVE | Noted: 2025-03-18

## 2025-03-18 LAB
ALBUMIN SERPL-MCNC: 4 G/DL (ref 3.5–5.2)
ALP SERPL-CCNC: 86 U/L (ref 40–129)
ALT SERPL-CCNC: 56 U/L (ref 10–50)
ANION GAP SERPL CALCULATED.3IONS-SCNC: 13 MMOL/L (ref 8–16)
AST SERPL-CCNC: 52 U/L (ref 10–50)
B PARAP IS1001 DNA NPH QL NAA+NON-PROBE: NOT DETECTED
B PERT.PT PRMT NPH QL NAA+NON-PROBE: NOT DETECTED
BACTERIA URNS QL MICRO: ABNORMAL /HPF
BASOPHILS # BLD: 0.1 K/UL (ref 0–0.2)
BASOPHILS NFR BLD: 0.7 % (ref 0–1)
BILIRUB SERPL-MCNC: 1.1 MG/DL (ref 0.2–1.2)
BILIRUB UR QL STRIP: NEGATIVE
BUN SERPL-MCNC: 19 MG/DL (ref 6–20)
C PNEUM DNA NPH QL NAA+NON-PROBE: NOT DETECTED
CALCIUM SERPL-MCNC: 9.3 MG/DL (ref 8.6–10)
CHLORIDE SERPL-SCNC: 101 MMOL/L (ref 98–107)
CLARITY UR: ABNORMAL
CO2 SERPL-SCNC: 26 MMOL/L (ref 22–29)
COLOR UR: YELLOW
CREAT SERPL-MCNC: 1.7 MG/DL (ref 0.7–1.2)
CRYSTALS URNS MICRO: ABNORMAL /HPF
EOSINOPHIL # BLD: 0.2 K/UL (ref 0–0.6)
EOSINOPHIL NFR BLD: 2 % (ref 0–5)
EPI CELLS #/AREA URNS AUTO: 1 /HPF (ref 0–5)
ERYTHROCYTE [DISTWIDTH] IN BLOOD BY AUTOMATED COUNT: 12.8 % (ref 11.5–14.5)
FLUAV RNA NPH QL NAA+NON-PROBE: NOT DETECTED
FLUBV RNA NPH QL NAA+NON-PROBE: NOT DETECTED
GLUCOSE SERPL-MCNC: 98 MG/DL (ref 70–99)
GLUCOSE UR STRIP.AUTO-MCNC: NEGATIVE MG/DL
HADV DNA NPH QL NAA+NON-PROBE: NOT DETECTED
HCOV 229E RNA NPH QL NAA+NON-PROBE: NOT DETECTED
HCOV HKU1 RNA NPH QL NAA+NON-PROBE: NOT DETECTED
HCOV NL63 RNA NPH QL NAA+NON-PROBE: NOT DETECTED
HCOV OC43 RNA NPH QL NAA+NON-PROBE: NOT DETECTED
HCT VFR BLD AUTO: 37 % (ref 42–52)
HGB BLD-MCNC: 11.7 G/DL (ref 14–18)
HGB UR STRIP.AUTO-MCNC: ABNORMAL MG/L
HMPV RNA NPH QL NAA+NON-PROBE: NOT DETECTED
HPIV1 RNA NPH QL NAA+NON-PROBE: NOT DETECTED
HPIV2 RNA NPH QL NAA+NON-PROBE: NOT DETECTED
HPIV3 RNA NPH QL NAA+NON-PROBE: NOT DETECTED
HPIV4 RNA NPH QL NAA+NON-PROBE: NOT DETECTED
HYALINE CASTS #/AREA URNS AUTO: 1 /HPF (ref 0–8)
IMM GRANULOCYTES # BLD: 0.1 K/UL
KETONES UR STRIP.AUTO-MCNC: NEGATIVE MG/DL
LACTATE BLDV-SCNC: 1.3 MMOL/L (ref 0.5–1.9)
LEUKOCYTE ESTERASE UR QL STRIP.AUTO: ABNORMAL
LYMPHOCYTES # BLD: 1.2 K/UL (ref 1.1–4.5)
LYMPHOCYTES NFR BLD: 13.9 % (ref 20–40)
M PNEUMO DNA NPH QL NAA+NON-PROBE: NOT DETECTED
MCH RBC QN AUTO: 28.3 PG (ref 27–31)
MCHC RBC AUTO-ENTMCNC: 31.6 G/DL (ref 33–37)
MCV RBC AUTO: 89.4 FL (ref 80–94)
MONOCYTES # BLD: 1 K/UL (ref 0–0.9)
MONOCYTES NFR BLD: 12 % (ref 0–10)
NEUTROPHILS # BLD: 6 K/UL (ref 1.5–7.5)
NEUTS SEG NFR BLD: 70 % (ref 50–65)
NITRITE UR QL STRIP.AUTO: NEGATIVE
PH UR STRIP.AUTO: 5.5 [PH] (ref 5–8)
PLATELET # BLD AUTO: 149 K/UL (ref 130–400)
PMV BLD AUTO: 12.2 FL (ref 9.4–12.4)
POTASSIUM SERPL-SCNC: 3.8 MMOL/L (ref 3.5–5)
PROT SERPL-MCNC: 7.6 G/DL (ref 6.4–8.3)
PROT UR STRIP.AUTO-MCNC: 100 MG/DL
RBC # BLD AUTO: 4.14 M/UL (ref 4.7–6.1)
RBC #/AREA URNS AUTO: 21 /HPF (ref 0–4)
RSV RNA NPH QL NAA+NON-PROBE: NOT DETECTED
RV+EV RNA NPH QL NAA+NON-PROBE: NOT DETECTED
SARS-COV-2 RNA NPH QL NAA+NON-PROBE: NOT DETECTED
SODIUM SERPL-SCNC: 140 MMOL/L (ref 136–145)
SP GR UR STRIP.AUTO: 1.02 (ref 1–1.03)
TROPONIN, HIGH SENSITIVITY: 10 NG/L (ref 0–22)
UROBILINOGEN UR STRIP.AUTO-MCNC: 1 E.U./DL
WBC # BLD AUTO: 8.6 K/UL (ref 4.8–10.8)
WBC #/AREA URNS AUTO: 288 /HPF (ref 0–5)

## 2025-03-18 PROCEDURE — 87077 CULTURE AEROBIC IDENTIFY: CPT

## 2025-03-18 PROCEDURE — 94760 N-INVAS EAR/PLS OXIMETRY 1: CPT

## 2025-03-18 PROCEDURE — 1200000000 HC SEMI PRIVATE

## 2025-03-18 PROCEDURE — 87491 CHLMYD TRACH DNA AMP PROBE: CPT

## 2025-03-18 PROCEDURE — 83605 ASSAY OF LACTIC ACID: CPT

## 2025-03-18 PROCEDURE — 93005 ELECTROCARDIOGRAM TRACING: CPT | Performed by: EMERGENCY MEDICINE

## 2025-03-18 PROCEDURE — 2500000003 HC RX 250 WO HCPCS: Performed by: EMERGENCY MEDICINE

## 2025-03-18 PROCEDURE — 70450 CT HEAD/BRAIN W/O DYE: CPT

## 2025-03-18 PROCEDURE — 87591 N.GONORRHOEAE DNA AMP PROB: CPT

## 2025-03-18 PROCEDURE — 87661 TRICHOMONAS VAGINALIS AMPLIF: CPT

## 2025-03-18 PROCEDURE — 6370000000 HC RX 637 (ALT 250 FOR IP)

## 2025-03-18 PROCEDURE — 2580000003 HC RX 258

## 2025-03-18 PROCEDURE — 99285 EMERGENCY DEPT VISIT HI MDM: CPT

## 2025-03-18 PROCEDURE — 0202U NFCT DS 22 TRGT SARS-COV-2: CPT

## 2025-03-18 PROCEDURE — 71045 X-RAY EXAM CHEST 1 VIEW: CPT

## 2025-03-18 PROCEDURE — 2580000003 HC RX 258: Performed by: EMERGENCY MEDICINE

## 2025-03-18 PROCEDURE — 36415 COLL VENOUS BLD VENIPUNCTURE: CPT

## 2025-03-18 PROCEDURE — 81001 URINALYSIS AUTO W/SCOPE: CPT

## 2025-03-18 PROCEDURE — 87186 SC STD MICRODIL/AGAR DIL: CPT

## 2025-03-18 PROCEDURE — 6360000002 HC RX W HCPCS: Performed by: EMERGENCY MEDICINE

## 2025-03-18 PROCEDURE — 87086 URINE CULTURE/COLONY COUNT: CPT

## 2025-03-18 PROCEDURE — 85025 COMPLETE CBC W/AUTO DIFF WBC: CPT

## 2025-03-18 PROCEDURE — 2500000003 HC RX 250 WO HCPCS

## 2025-03-18 PROCEDURE — 96374 THER/PROPH/DIAG INJ IV PUSH: CPT

## 2025-03-18 PROCEDURE — 80053 COMPREHEN METABOLIC PANEL: CPT

## 2025-03-18 PROCEDURE — 74150 CT ABDOMEN W/O CONTRAST: CPT

## 2025-03-18 PROCEDURE — 96375 TX/PRO/DX INJ NEW DRUG ADDON: CPT

## 2025-03-18 PROCEDURE — 87040 BLOOD CULTURE FOR BACTERIA: CPT

## 2025-03-18 PROCEDURE — 84484 ASSAY OF TROPONIN QUANT: CPT

## 2025-03-18 RX ORDER — ACETAMINOPHEN 650 MG/1
650 SUPPOSITORY RECTAL EVERY 6 HOURS PRN
Status: DISCONTINUED | OUTPATIENT
Start: 2025-03-18 | End: 2025-03-20 | Stop reason: HOSPADM

## 2025-03-18 RX ORDER — SODIUM CHLORIDE 9 MG/ML
INJECTION, SOLUTION INTRAVENOUS PRN
Status: DISCONTINUED | OUTPATIENT
Start: 2025-03-18 | End: 2025-03-20 | Stop reason: HOSPADM

## 2025-03-18 RX ORDER — ALLOPURINOL 100 MG/1
100 TABLET ORAL DAILY
Status: DISCONTINUED | OUTPATIENT
Start: 2025-03-18 | End: 2025-03-20 | Stop reason: HOSPADM

## 2025-03-18 RX ORDER — ROSUVASTATIN CALCIUM 10 MG/1
5 TABLET, COATED ORAL NIGHTLY
Status: DISCONTINUED | OUTPATIENT
Start: 2025-03-18 | End: 2025-03-20 | Stop reason: HOSPADM

## 2025-03-18 RX ORDER — AMLODIPINE BESYLATE 5 MG/1
5 TABLET ORAL DAILY
Status: DISCONTINUED | OUTPATIENT
Start: 2025-03-18 | End: 2025-03-20 | Stop reason: HOSPADM

## 2025-03-18 RX ORDER — METOCLOPRAMIDE HYDROCHLORIDE 5 MG/ML
10 INJECTION INTRAMUSCULAR; INTRAVENOUS ONCE
Status: COMPLETED | OUTPATIENT
Start: 2025-03-18 | End: 2025-03-18

## 2025-03-18 RX ORDER — ONDANSETRON 2 MG/ML
4 INJECTION INTRAMUSCULAR; INTRAVENOUS EVERY 6 HOURS PRN
Status: DISCONTINUED | OUTPATIENT
Start: 2025-03-18 | End: 2025-03-20 | Stop reason: HOSPADM

## 2025-03-18 RX ORDER — DIPHENHYDRAMINE HYDROCHLORIDE 50 MG/ML
25 INJECTION, SOLUTION INTRAMUSCULAR; INTRAVENOUS ONCE
Status: COMPLETED | OUTPATIENT
Start: 2025-03-18 | End: 2025-03-18

## 2025-03-18 RX ORDER — SODIUM CHLORIDE 9 MG/ML
INJECTION, SOLUTION INTRAVENOUS CONTINUOUS
Status: ACTIVE | OUTPATIENT
Start: 2025-03-18 | End: 2025-03-19

## 2025-03-18 RX ORDER — LOSARTAN POTASSIUM 25 MG/1
25 TABLET ORAL DAILY
Status: DISCONTINUED | OUTPATIENT
Start: 2025-03-18 | End: 2025-03-20 | Stop reason: HOSPADM

## 2025-03-18 RX ORDER — POLYETHYLENE GLYCOL 3350 17 G/17G
17 POWDER, FOR SOLUTION ORAL DAILY PRN
Status: DISCONTINUED | OUTPATIENT
Start: 2025-03-18 | End: 2025-03-20 | Stop reason: HOSPADM

## 2025-03-18 RX ORDER — ENOXAPARIN SODIUM 100 MG/ML
40 INJECTION SUBCUTANEOUS 2 TIMES DAILY
Status: DISCONTINUED | OUTPATIENT
Start: 2025-03-18 | End: 2025-03-20 | Stop reason: HOSPADM

## 2025-03-18 RX ORDER — ACETAMINOPHEN 325 MG/1
650 TABLET ORAL EVERY 6 HOURS PRN
Status: DISCONTINUED | OUTPATIENT
Start: 2025-03-18 | End: 2025-03-20 | Stop reason: HOSPADM

## 2025-03-18 RX ORDER — 0.9 % SODIUM CHLORIDE 0.9 %
1000 INTRAVENOUS SOLUTION INTRAVENOUS ONCE
Status: COMPLETED | OUTPATIENT
Start: 2025-03-18 | End: 2025-03-18

## 2025-03-18 RX ORDER — SODIUM CHLORIDE 0.9 % (FLUSH) 0.9 %
5-40 SYRINGE (ML) INJECTION PRN
Status: DISCONTINUED | OUTPATIENT
Start: 2025-03-18 | End: 2025-03-20 | Stop reason: HOSPADM

## 2025-03-18 RX ORDER — PANTOPRAZOLE SODIUM 40 MG/1
40 TABLET, DELAYED RELEASE ORAL DAILY
Status: DISCONTINUED | OUTPATIENT
Start: 2025-03-18 | End: 2025-03-20 | Stop reason: HOSPADM

## 2025-03-18 RX ORDER — ONDANSETRON 4 MG/1
4 TABLET, ORALLY DISINTEGRATING ORAL EVERY 8 HOURS PRN
Status: DISCONTINUED | OUTPATIENT
Start: 2025-03-18 | End: 2025-03-20 | Stop reason: HOSPADM

## 2025-03-18 RX ORDER — SODIUM CHLORIDE 0.9 % (FLUSH) 0.9 %
5-40 SYRINGE (ML) INJECTION EVERY 12 HOURS SCHEDULED
Status: DISCONTINUED | OUTPATIENT
Start: 2025-03-18 | End: 2025-03-20 | Stop reason: HOSPADM

## 2025-03-18 RX ADMIN — AMLODIPINE BESYLATE 5 MG: 5 TABLET ORAL at 20:04

## 2025-03-18 RX ADMIN — SODIUM CHLORIDE 1000 ML: 0.9 INJECTION, SOLUTION INTRAVENOUS at 16:02

## 2025-03-18 RX ADMIN — WATER 1000 MG: 1 INJECTION INTRAMUSCULAR; INTRAVENOUS; SUBCUTANEOUS at 16:17

## 2025-03-18 RX ADMIN — LOSARTAN POTASSIUM 25 MG: 25 TABLET, FILM COATED ORAL at 20:04

## 2025-03-18 RX ADMIN — METOCLOPRAMIDE HYDROCHLORIDE 10 MG: 5 INJECTION INTRAMUSCULAR; INTRAVENOUS at 14:34

## 2025-03-18 RX ADMIN — ACETAMINOPHEN 650 MG: 325 TABLET ORAL at 20:07

## 2025-03-18 RX ADMIN — ALLOPURINOL 100 MG: 100 TABLET ORAL at 20:04

## 2025-03-18 RX ADMIN — DIPHENHYDRAMINE HYDROCHLORIDE 25 MG: 50 INJECTION INTRAMUSCULAR; INTRAVENOUS at 14:34

## 2025-03-18 RX ADMIN — PANTOPRAZOLE SODIUM 40 MG: 40 TABLET, DELAYED RELEASE ORAL at 20:04

## 2025-03-18 RX ADMIN — ROSUVASTATIN CALCIUM 5 MG: 20 TABLET, FILM COATED ORAL at 20:04

## 2025-03-18 RX ADMIN — SODIUM CHLORIDE, PRESERVATIVE FREE 10 ML: 5 INJECTION INTRAVENOUS at 20:02

## 2025-03-18 RX ADMIN — SODIUM CHLORIDE: 9 INJECTION, SOLUTION INTRAVENOUS at 20:04

## 2025-03-18 SDOH — ECONOMIC STABILITY: FOOD INSECURITY: WITHIN THE PAST 12 MONTHS, YOU WORRIED THAT YOUR FOOD WOULD RUN OUT BEFORE YOU GOT MONEY TO BUY MORE.: NEVER TRUE

## 2025-03-18 SDOH — ECONOMIC STABILITY: INCOME INSECURITY: HOW HARD IS IT FOR YOU TO PAY FOR THE VERY BASICS LIKE FOOD, HOUSING, MEDICAL CARE, AND HEATING?: NOT HARD AT ALL

## 2025-03-18 SDOH — ECONOMIC STABILITY: INCOME INSECURITY: IN THE PAST 12 MONTHS, HAS THE ELECTRIC, GAS, OIL, OR WATER COMPANY THREATENED TO SHUT OFF SERVICE IN YOUR HOME?: NO

## 2025-03-18 ASSESSMENT — ENCOUNTER SYMPTOMS
SHORTNESS OF BREATH: 0
COUGH: 0
COLOR CHANGE: 0
DIARRHEA: 1
VOMITING: 0
EYE PAIN: 0
BACK PAIN: 1
ABDOMINAL PAIN: 0
BLOOD IN STOOL: 0
NAUSEA: 0
WHEEZING: 0

## 2025-03-18 ASSESSMENT — PATIENT HEALTH QUESTIONNAIRE - PHQ9
SUM OF ALL RESPONSES TO PHQ QUESTIONS 1-9: 0
SUM OF ALL RESPONSES TO PHQ QUESTIONS 1-9: 0
1. LITTLE INTEREST OR PLEASURE IN DOING THINGS: NOT AT ALL
SUM OF ALL RESPONSES TO PHQ QUESTIONS 1-9: 0
2. FEELING DOWN, DEPRESSED OR HOPELESS: NOT AT ALL
SUM OF ALL RESPONSES TO PHQ QUESTIONS 1-9: 0

## 2025-03-18 ASSESSMENT — PAIN SCALES - GENERAL: PAINLEVEL_OUTOF10: 9

## 2025-03-18 NOTE — ED NOTES
Lab contacted to obtain blood   
Depression     Gout     Hypertension     Vitamin D deficiency        Assessment  Vitals: Level of Consciousness: Alert (0)   Vitals:    03/18/25 1309 03/18/25 1521 03/18/25 1523 03/18/25 1529   BP: (!) 134/115      Pulse:   78    Resp: 18      Temp: 98.8 °F (37.1 °C)      SpO2: 96% 95%  95%   Weight: (!) 158.8 kg (350 lb)        Predictive Model Details          18 (Normal)  Factor Value    Calculated 3/18/2025 18:37 62% Age 54 years old    Deterioration Index Model 16% Respiratory rate 18     8% Hematocrit abnormal (37.0 %)     8% Systolic 134     3% WBC count 8.6 K/uL     1% Potassium 3.8 mmol/L     1% Sodium 140 mmol/L     1% Temperature 98.8 °F (37.1 °C)     0% Pulse 78     0% Pulse oximetry 95 %       NPO? No  O2 Flow Rate: O2 Device: None (Room air)    Cardiac Rhythm:   NIH Score: NIH     Active LDA's:   Peripheral IV 03/18/25 Left Antecubital (Active)     Pertinent or High Risk Medications/Drips: no   If Yes, please provide details:   Blood Product Administration:   If Yes, please provide details:   Sepsis Risk Score      Admitted with Sepsis? No    Recommendation  Incomplete orders: no  Patient Belongings: with pt  Additional Comments:   If any further questions, please call Sending RN at [phone]    Electronically signed by: Electronically signed by CECE PRETTY RN on 3/18/2025 at 6:37 PM

## 2025-03-18 NOTE — H&P
flank pain and hematuria.   Musculoskeletal:  Positive for back pain. Negative for neck pain.   Skin:  Negative for color change.   Neurological:  Positive for headaches. Negative for dizziness, weakness and light-headedness.      14 point review of systems is negative except as specifically addressed above.    Physical Examination:  BP (!) 134/115   Pulse 78   Temp 98.8 °F (37.1 °C)   Resp 18   Wt (!) 158.8 kg (350 lb)   SpO2 95%   BMI 44.94 kg/m²   Physical Exam  Vitals and nursing note reviewed.   Constitutional:       Appearance: Normal appearance. He is not ill-appearing.   HENT:      Head: Normocephalic and atraumatic.      Mouth/Throat:      Mouth: Mucous membranes are moist.      Pharynx: Oropharynx is clear.   Eyes:      Pupils: Pupils are equal, round, and reactive to light.   Cardiovascular:      Rate and Rhythm: Normal rate and regular rhythm.      Heart sounds: Normal heart sounds.   Pulmonary:      Effort: Pulmonary effort is normal. No respiratory distress.      Breath sounds: Normal breath sounds. No wheezing, rhonchi or rales.   Abdominal:      General: Bowel sounds are normal. There is no distension.      Palpations: Abdomen is soft.      Tenderness: There is no abdominal tenderness. There is no guarding.   Musculoskeletal:         General: Normal range of motion.      Cervical back: Normal range of motion.      Right lower leg: No edema.      Left lower leg: No edema.   Skin:     General: Skin is warm and dry.      Capillary Refill: Capillary refill takes less than 2 seconds.   Neurological:      Mental Status: He is alert and oriented to person, place, and time. Mental status is at baseline.      Motor: No weakness.      Diagnostic Data:  CBC:  Recent Labs     03/18/25  1419   WBC 8.6   HGB 11.7*   HCT 37.0*        BMP:  Recent Labs     03/18/25  1419      K 3.8      CO2 26   BUN 19   CREATININE 1.7*   CALCIUM 9.3     Recent Labs     03/18/25  1419   AST 52*   ALT 56*

## 2025-03-18 NOTE — ED PROVIDER NOTES
components:    Clarity, UA CLOUDY (*)     Blood, Urine MODERATE (*)     Protein,  (*)     Leukocyte Esterase, Urine LARGE (*)     All other components within normal limits   MICROSCOPIC URINALYSIS - Abnormal; Notable for the following components:    WBC,  (*)     RBC, UA 21 (*)     All other components within normal limits   RESPIRATORY PANEL, MOLECULAR, WITH COVID-19   CULTURE, URINE   CULTURE, BLOOD 2   CULTURE, BLOOD 1   TROPONIN   LACTIC ACID       All other labs were within normal range or not returned as of this dictation.    EMERGENCY DEPARTMENT COURSE and DIFFERENTIALDIAGNOSIS/MDM:   Vitals:    Vitals:    03/18/25 1309 03/18/25 1521 03/18/25 1523 03/18/25 1529   BP: (!) 134/115      Pulse:   78    Resp: 18      Temp: 98.8 °F (37.1 °C)      SpO2: 96% 95%  95%   Weight: (!) 158.8 kg (350 lb)          MDM    Reviewed results.  Normal lactic.  Normal white count.  CMP shows evidence of AC with a creatinine of 1.7 today.  LFTs up just slightly.  Normal bilirubin.  Patient denies any abdominal pain or tenderness.  UA looks consistent with urinary tract infection with large number of white cells and 4+ bacteria.  Patient is a  and goes prolonged periods of time without urinating so suspect this might be what led to his urinary tract infection.  No history of UTI or kidney disease in the past.  Head CT shows no acute intracranial findings or hemorrhage.  CT renal protocol shows no acute findings.  Chest x-ray shows nothing acute.  Patient's headache not described as the worst headache of his life or thunderclap onset.  No neck pain or stiffness.  Symptoms do not sound consistent with subarachnoid hemorrhage or aneurysm/meningitis/encephalitis so see no indication for CTA or LP.  I think the symptoms are probably all due to UTI.  Blood culture empiric antibiotics and fluids given.  Urine culture pending.  Patient said that he has been having urinary urgency and not getting to the bathroom

## 2025-03-19 ENCOUNTER — APPOINTMENT (OUTPATIENT)
Age: 55
DRG: 690 | End: 2025-03-19
Payer: MEDICAID

## 2025-03-19 LAB
ANION GAP SERPL CALCULATED.3IONS-SCNC: 11 MMOL/L (ref 8–16)
BASOPHILS # BLD: 0.1 K/UL (ref 0–0.2)
BASOPHILS NFR BLD: 0.7 % (ref 0–1)
BUN SERPL-MCNC: 16 MG/DL (ref 6–20)
C TRACH DNA UR QL NAA+PROBE: NOT DETECTED
CALCIUM SERPL-MCNC: 8.5 MG/DL (ref 8.6–10)
CHLORIDE SERPL-SCNC: 103 MMOL/L (ref 98–107)
CO2 SERPL-SCNC: 25 MMOL/L (ref 22–29)
CREAT SERPL-MCNC: 1.4 MG/DL (ref 0.7–1.2)
ECHO AO ASC DIAM: 3.5 CM
ECHO AO ASCENDING AORTA INDEX: 1.27 CM/M2
ECHO AO ROOT DIAM: 1.9 CM
ECHO AO ROOT INDEX: 0.69 CM/M2
ECHO AO SINUS VALSALVA DIAM: 3.2 CM
ECHO AO SINUS VALSALVA INDEX: 1.16 CM/M2
ECHO AO ST JNCT DIAM: 2.9 CM
ECHO AV AREA PEAK VELOCITY: 4.2 CM2
ECHO AV AREA VTI: 4.1 CM2
ECHO AV AREA/BSA PEAK VELOCITY: 1.5 CM2/M2
ECHO AV AREA/BSA VTI: 1.5 CM2/M2
ECHO AV MEAN GRADIENT: 3 MMHG
ECHO AV MEAN GRADIENT: 3 MMHG
ECHO AV MEAN VELOCITY: 0.8 M/S
ECHO AV PEAK GRADIENT: 6 MMHG
ECHO AV PEAK VELOCITY: 1.2 M/S
ECHO AV VELOCITY RATIO: 1.08
ECHO AV VTI: 21.4 CM
ECHO BSA: 2.88 M2
ECHO EST RA PRESSURE: 3 MMHG
ECHO IVC PROX: 1.5 CM
ECHO LA AREA 2C: 13.1 CM2
ECHO LA AREA 4C: 13 CM2
ECHO LA DIAMETER INDEX: 1.27 CM/M2
ECHO LA DIAMETER: 3.5 CM
ECHO LA MAJOR AXIS: 4.2 CM
ECHO LA MINOR AXIS: 4.5 CM
ECHO LA TO AORTIC ROOT RATIO: 1.84
ECHO LA VOL BP: 31 ML (ref 18–58)
ECHO LA VOL MOD A2C: 31 ML (ref 18–58)
ECHO LA VOL MOD A4C: 30 ML (ref 18–58)
ECHO LA VOL/BSA BIPLANE: 11 ML/M2 (ref 16–34)
ECHO LA VOLUME INDEX MOD A2C: 11 ML/M2 (ref 16–34)
ECHO LA VOLUME INDEX MOD A4C: 11 ML/M2 (ref 16–34)
ECHO LV E' LATERAL VELOCITY: 12.8 CM/S
ECHO LV E' SEPTAL VELOCITY: 8.38 CM/S
ECHO LV EDV A2C: 67 ML
ECHO LV EDV A4C: 87 ML
ECHO LV EDV INDEX A4C: 32 ML/M2
ECHO LV EDV NDEX A2C: 24 ML/M2
ECHO LV EJECTION FRACTION A2C: 50 %
ECHO LV EJECTION FRACTION A4C: 63 %
ECHO LV EJECTION FRACTION BIPLANE: 60 % (ref 55–100)
ECHO LV ESV A2C: 33 ML
ECHO LV ESV A4C: 32 ML
ECHO LV ESV INDEX A2C: 12 ML/M2
ECHO LV ESV INDEX A4C: 12 ML/M2
ECHO LV FRACTIONAL SHORTENING: 33 % (ref 28–44)
ECHO LV INTERNAL DIMENSION DIASTOLE INDEX: 1.52 CM/M2
ECHO LV INTERNAL DIMENSION DIASTOLIC: 4.2 CM (ref 4.2–5.9)
ECHO LV INTERNAL DIMENSION SYSTOLIC INDEX: 1.01 CM/M2
ECHO LV INTERNAL DIMENSION SYSTOLIC: 2.8 CM
ECHO LV IVSD: 1.2 CM (ref 0.6–1)
ECHO LV MASS 2D: 200.6 G (ref 88–224)
ECHO LV MASS INDEX 2D: 72.7 G/M2 (ref 49–115)
ECHO LV POSTERIOR WALL DIASTOLIC: 1.4 CM (ref 0.6–1)
ECHO LV RELATIVE WALL THICKNESS RATIO: 0.67
ECHO LVOT AREA: 4.2 CM2
ECHO LVOT AV VTI INDEX: 0.98
ECHO LVOT DIAM: 2.3 CM
ECHO LVOT MEAN GRADIENT: 3 MMHG
ECHO LVOT PEAK GRADIENT: 6 MMHG
ECHO LVOT PEAK VELOCITY: 1.3 M/S
ECHO LVOT STROKE VOLUME INDEX: 31.6 ML/M2
ECHO LVOT SV: 87.2 ML
ECHO LVOT VTI: 21 CM
ECHO MV A VELOCITY: 0.68 M/S
ECHO MV AREA VTI: 3.9 CM2
ECHO MV E DECELERATION TIME (DT): 174 MS
ECHO MV E VELOCITY: 0.84 M/S
ECHO MV E/A RATIO: 1.24
ECHO MV E/E' LATERAL: 6.56
ECHO MV E/E' RATIO (AVERAGED): 8.29
ECHO MV E/E' SEPTAL: 10.02
ECHO MV LVOT VTI INDEX: 1.05
ECHO MV MAX VELOCITY: 0.9 M/S
ECHO MV MEAN GRADIENT: 1 MMHG
ECHO MV MEAN VELOCITY: 0.5 M/S
ECHO MV PEAK GRADIENT: 3 MMHG
ECHO MV VTI: 22.1 CM
ECHO RA AREA 4C: 10 CM2
ECHO RA END SYSTOLIC VOLUME APICAL 4 CHAMBER INDEX BSA: 7 ML/M2
ECHO RA VOLUME: 20 ML
ECHO RIGHT VENTRICULAR SYSTOLIC PRESSURE (RVSP): 22 MMHG
ECHO RV BASAL DIMENSION: 3.1 CM
ECHO RV INTERNAL DIMENSION: 3.2 CM
ECHO RV LONGITUDINAL DIMENSION: 7.5 CM
ECHO RV MID DIMENSION: 2.6 CM
ECHO RV TAPSE: 1.8 CM (ref 1.7–?)
ECHO TV REGURGITANT MAX VELOCITY: 2.18 M/S
ECHO TV REGURGITANT PEAK GRADIENT: 19 MMHG
EKG P AXIS: 36 DEGREES
EKG P-R INTERVAL: 156 MS
EKG Q-T INTERVAL: 304 MS
EKG QRS DURATION: 72 MS
EKG QTC CALCULATION (BAZETT): 367 MS
EKG T AXIS: 49 DEGREES
EOSINOPHIL # BLD: 0.2 K/UL (ref 0–0.6)
EOSINOPHIL NFR BLD: 2 % (ref 0–5)
ERYTHROCYTE [DISTWIDTH] IN BLOOD BY AUTOMATED COUNT: 12.8 % (ref 11.5–14.5)
GLUCOSE SERPL-MCNC: 140 MG/DL (ref 70–99)
HCT VFR BLD AUTO: 32.1 % (ref 42–52)
HGB BLD-MCNC: 10 G/DL (ref 14–18)
IMM GRANULOCYTES # BLD: 0.2 K/UL
LYMPHOCYTES # BLD: 1.2 K/UL (ref 1.1–4.5)
LYMPHOCYTES NFR BLD: 15.2 % (ref 20–40)
MAGNESIUM SERPL-MCNC: 1.9 MG/DL (ref 1.6–2.6)
MCH RBC QN AUTO: 28.2 PG (ref 27–31)
MCHC RBC AUTO-ENTMCNC: 31.2 G/DL (ref 33–37)
MCV RBC AUTO: 90.4 FL (ref 80–94)
MONOCYTES # BLD: 1.2 K/UL (ref 0–0.9)
MONOCYTES NFR BLD: 15.4 % (ref 0–10)
N GONORRHOEA DNA UR QL NAA+PROBE: NOT DETECTED
NEUTROPHILS # BLD: 4.9 K/UL (ref 1.5–7.5)
NEUTS SEG NFR BLD: 64.6 % (ref 50–65)
PLATELET # BLD AUTO: 143 K/UL (ref 130–400)
PMV BLD AUTO: 11.7 FL (ref 9.4–12.4)
POTASSIUM SERPL-SCNC: 3.3 MMOL/L (ref 3.5–5)
RBC # BLD AUTO: 3.55 M/UL (ref 4.7–6.1)
SODIUM SERPL-SCNC: 139 MMOL/L (ref 136–145)
T VAGINALIS DNA UR QL NAA+PROBE: NOT DETECTED
WBC # BLD AUTO: 7.6 K/UL (ref 4.8–10.8)

## 2025-03-19 PROCEDURE — 6370000000 HC RX 637 (ALT 250 FOR IP)

## 2025-03-19 PROCEDURE — 1200000000 HC SEMI PRIVATE

## 2025-03-19 PROCEDURE — 6370000000 HC RX 637 (ALT 250 FOR IP): Performed by: EMERGENCY MEDICINE

## 2025-03-19 PROCEDURE — 80048 BASIC METABOLIC PNL TOTAL CA: CPT

## 2025-03-19 PROCEDURE — C8929 TTE W OR WO FOL WCON,DOPPLER: HCPCS

## 2025-03-19 PROCEDURE — 94760 N-INVAS EAR/PLS OXIMETRY 1: CPT

## 2025-03-19 PROCEDURE — 93306 TTE W/DOPPLER COMPLETE: CPT | Performed by: INTERNAL MEDICINE

## 2025-03-19 PROCEDURE — 93010 ELECTROCARDIOGRAM REPORT: CPT | Performed by: INTERNAL MEDICINE

## 2025-03-19 PROCEDURE — 36415 COLL VENOUS BLD VENIPUNCTURE: CPT

## 2025-03-19 PROCEDURE — 2500000003 HC RX 250 WO HCPCS

## 2025-03-19 PROCEDURE — 6360000002 HC RX W HCPCS

## 2025-03-19 PROCEDURE — 83735 ASSAY OF MAGNESIUM: CPT

## 2025-03-19 PROCEDURE — 85025 COMPLETE CBC W/AUTO DIFF WBC: CPT

## 2025-03-19 PROCEDURE — 6360000004 HC RX CONTRAST MEDICATION

## 2025-03-19 RX ORDER — BUTALBITAL, ACETAMINOPHEN AND CAFFEINE 50; 325; 40 MG/1; MG/1; MG/1
1 TABLET ORAL EVERY 4 HOURS PRN
Status: DISCONTINUED | OUTPATIENT
Start: 2025-03-19 | End: 2025-03-20 | Stop reason: HOSPADM

## 2025-03-19 RX ORDER — POTASSIUM CHLORIDE 7.45 MG/ML
10 INJECTION INTRAVENOUS PRN
Status: DISCONTINUED | OUTPATIENT
Start: 2025-03-19 | End: 2025-03-20 | Stop reason: HOSPADM

## 2025-03-19 RX ORDER — POTASSIUM CHLORIDE 1500 MG/1
40 TABLET, EXTENDED RELEASE ORAL PRN
Status: DISCONTINUED | OUTPATIENT
Start: 2025-03-19 | End: 2025-03-20 | Stop reason: HOSPADM

## 2025-03-19 RX ADMIN — POTASSIUM BICARBONATE 40 MEQ: 782 TABLET, EFFERVESCENT ORAL at 06:34

## 2025-03-19 RX ADMIN — ACETAMINOPHEN 650 MG: 325 TABLET ORAL at 06:37

## 2025-03-19 RX ADMIN — AMLODIPINE BESYLATE 5 MG: 5 TABLET ORAL at 08:00

## 2025-03-19 RX ADMIN — BUTALBITAL, ACETAMINOPHEN, AND CAFFEINE 1 TABLET: 50; 325; 40 TABLET ORAL at 19:54

## 2025-03-19 RX ADMIN — WATER 1000 MG: 1 INJECTION INTRAMUSCULAR; INTRAVENOUS; SUBCUTANEOUS at 15:37

## 2025-03-19 RX ADMIN — ROSUVASTATIN CALCIUM 5 MG: 20 TABLET, FILM COATED ORAL at 19:54

## 2025-03-19 RX ADMIN — SULFUR HEXAFLUORIDE 2 ML: 60.7; .19; .19 INJECTION, POWDER, LYOPHILIZED, FOR SUSPENSION INTRAVENOUS; INTRAVESICAL at 07:57

## 2025-03-19 RX ADMIN — BUTALBITAL, ACETAMINOPHEN, AND CAFFEINE 1 TABLET: 50; 325; 40 TABLET ORAL at 11:48

## 2025-03-19 RX ADMIN — PANTOPRAZOLE SODIUM 40 MG: 40 TABLET, DELAYED RELEASE ORAL at 08:00

## 2025-03-19 RX ADMIN — LOSARTAN POTASSIUM 25 MG: 25 TABLET, FILM COATED ORAL at 08:00

## 2025-03-19 RX ADMIN — ALLOPURINOL 100 MG: 100 TABLET ORAL at 08:00

## 2025-03-19 ASSESSMENT — PAIN DESCRIPTION - LOCATION: LOCATION: HEAD

## 2025-03-19 ASSESSMENT — PAIN SCALES - GENERAL
PAINLEVEL_OUTOF10: 5
PAINLEVEL_OUTOF10: 6

## 2025-03-19 ASSESSMENT — PAIN DESCRIPTION - DESCRIPTORS: DESCRIPTORS: ACHING

## 2025-03-19 NOTE — DISCHARGE INSTRUCTIONS
people and kinds of treatments that can help.  Treatment for alcohol use disorder can include:  Group therapy, one or more types of counseling, and alcohol education.  Medicines that help to:  Reduce withdrawal symptoms and help you safely stop drinking.  Reduce cravings for alcohol.  Support groups. These groups include Alcoholics Anonymous and [employer] (Self-Management and Recovery Training).  Some people are able to stop or cut back on drinking with help from a counselor. People who have moderate to severe alcohol use disorder may need medical treatment. They may need to stay in a hospital or treatment center.  You may have a treatment team to help you. This team may include a psychologist or psychiatrist, counselors, doctors, social workers, nurses, and a . A  helps plan and manage your treatment.  Follow-up care is a key part of your treatment and safety. Be sure to make and go to all appointments, and call your doctor if you are having problems. It's also a good idea to know your test results and keep a list of the medicines you take.  Where can you learn more?  Go to https://www.[employer].net/patientEd and enter H758 to learn more about \"Learning About Alcohol Use Disorder.\"  Current as of: August 20, 2024  Content Version: 14.4  © 2024-2025 [employer].   Care instructions adapted under license by [employer]. If you have questions about a medical condition or this instruction, always ask your healthcare professional. [employer] disclaims any warranty or liability for your use of this information.

## 2025-03-20 VITALS
HEIGHT: 74 IN | HEART RATE: 71 BPM | TEMPERATURE: 98.4 F | OXYGEN SATURATION: 98 % | WEIGHT: 315 LBS | DIASTOLIC BLOOD PRESSURE: 84 MMHG | RESPIRATION RATE: 18 BRPM | BODY MASS INDEX: 40.43 KG/M2 | SYSTOLIC BLOOD PRESSURE: 139 MMHG

## 2025-03-20 LAB
ANION GAP SERPL CALCULATED.3IONS-SCNC: 9 MMOL/L (ref 8–16)
BACTERIA UR CULT: ABNORMAL
BASOPHILS # BLD: 0.1 K/UL (ref 0–0.2)
BASOPHILS NFR BLD: 0.9 % (ref 0–1)
BUN SERPL-MCNC: 12 MG/DL (ref 6–20)
CALCIUM SERPL-MCNC: 8.9 MG/DL (ref 8.6–10)
CHLORIDE SERPL-SCNC: 104 MMOL/L (ref 98–107)
CO2 SERPL-SCNC: 27 MMOL/L (ref 22–29)
CREAT SERPL-MCNC: 1.4 MG/DL (ref 0.7–1.2)
EOSINOPHIL # BLD: 0.3 K/UL (ref 0–0.6)
EOSINOPHIL NFR BLD: 3.3 % (ref 0–5)
ERYTHROCYTE [DISTWIDTH] IN BLOOD BY AUTOMATED COUNT: 12.6 % (ref 11.5–14.5)
FOLATE SERPL-MCNC: 11.8 NG/ML (ref 4.5–32.2)
GLUCOSE SERPL-MCNC: 106 MG/DL (ref 70–99)
HCT VFR BLD AUTO: 33.3 % (ref 42–52)
HGB BLD-MCNC: 10.3 G/DL (ref 14–18)
IMM GRANULOCYTES # BLD: 0.2 K/UL
IRON SATN MFR SERPL: 17 % (ref 20–50)
IRON SERPL-MCNC: 36 UG/DL (ref 59–158)
LYMPHOCYTES # BLD: 1.4 K/UL (ref 1.1–4.5)
LYMPHOCYTES NFR BLD: 18.3 % (ref 20–40)
MCH RBC QN AUTO: 27.9 PG (ref 27–31)
MCHC RBC AUTO-ENTMCNC: 30.9 G/DL (ref 33–37)
MCV RBC AUTO: 90.2 FL (ref 80–94)
MONOCYTES # BLD: 1.1 K/UL (ref 0–0.9)
MONOCYTES NFR BLD: 13.7 % (ref 0–10)
NEUTROPHILS # BLD: 4.7 K/UL (ref 1.5–7.5)
NEUTS SEG NFR BLD: 61.1 % (ref 50–65)
ORGANISM: ABNORMAL
ORGANISM: ABNORMAL
PLATELET # BLD AUTO: 151 K/UL (ref 130–400)
PMV BLD AUTO: 11.9 FL (ref 9.4–12.4)
POTASSIUM SERPL-SCNC: 3.8 MMOL/L (ref 3.5–5)
RBC # BLD AUTO: 3.69 M/UL (ref 4.7–6.1)
SODIUM SERPL-SCNC: 140 MMOL/L (ref 136–145)
TIBC SERPL-MCNC: 214 UG/DL (ref 250–400)
VIT B12 SERPL-MCNC: 519 PG/ML (ref 232–1245)
WBC # BLD AUTO: 7.7 K/UL (ref 4.8–10.8)

## 2025-03-20 PROCEDURE — 2500000003 HC RX 250 WO HCPCS

## 2025-03-20 PROCEDURE — 36415 COLL VENOUS BLD VENIPUNCTURE: CPT

## 2025-03-20 PROCEDURE — 6370000000 HC RX 637 (ALT 250 FOR IP)

## 2025-03-20 PROCEDURE — 85025 COMPLETE CBC W/AUTO DIFF WBC: CPT

## 2025-03-20 PROCEDURE — 83540 ASSAY OF IRON: CPT

## 2025-03-20 PROCEDURE — 83550 IRON BINDING TEST: CPT

## 2025-03-20 PROCEDURE — 82607 VITAMIN B-12: CPT

## 2025-03-20 PROCEDURE — 94760 N-INVAS EAR/PLS OXIMETRY 1: CPT

## 2025-03-20 PROCEDURE — 82746 ASSAY OF FOLIC ACID SERUM: CPT

## 2025-03-20 PROCEDURE — 80048 BASIC METABOLIC PNL TOTAL CA: CPT

## 2025-03-20 RX ORDER — CEFDINIR 300 MG/1
300 CAPSULE ORAL 2 TIMES DAILY
Qty: 10 CAPSULE | Refills: 0 | Status: SHIPPED | OUTPATIENT
Start: 2025-03-20 | End: 2025-03-25

## 2025-03-20 RX ADMIN — SODIUM CHLORIDE, PRESERVATIVE FREE 10 ML: 5 INJECTION INTRAVENOUS at 08:17

## 2025-03-20 RX ADMIN — ALLOPURINOL 100 MG: 100 TABLET ORAL at 09:52

## 2025-03-20 RX ADMIN — BUTALBITAL, ACETAMINOPHEN, AND CAFFEINE 1 TABLET: 50; 325; 40 TABLET ORAL at 08:20

## 2025-03-20 RX ADMIN — AMLODIPINE BESYLATE 5 MG: 5 TABLET ORAL at 09:52

## 2025-03-20 RX ADMIN — PANTOPRAZOLE SODIUM 40 MG: 40 TABLET, DELAYED RELEASE ORAL at 09:51

## 2025-03-20 ASSESSMENT — PAIN DESCRIPTION - LOCATION: LOCATION: HEAD

## 2025-03-20 ASSESSMENT — PAIN DESCRIPTION - DESCRIPTORS: DESCRIPTORS: ACHING

## 2025-03-20 ASSESSMENT — PAIN DESCRIPTION - ORIENTATION: ORIENTATION: MID

## 2025-03-20 ASSESSMENT — PAIN SCALES - GENERAL: PAINLEVEL_OUTOF10: 4

## 2025-03-20 NOTE — PROGRESS NOTES
24 hour orders verified    
4 Eyes Skin Assessment     NAME:  Phillip Valerio  YOB: 1970  MEDICAL RECORD NUMBER:  [id number]    The patient is being assessed for  Admission    I agree that at least one RN has performed a thorough Head to Toe Skin Assessment on the patient. ALL assessment sites listed below have been assessed.      Areas assessed by both nurses:    Head, Face, Ears, Shoulders, Back, Chest, Arms, Elbows, Hands, Sacrum. Buttock, Coccyx, Ischium, and Legs. Feet and Heels        Does the Patient have a Wound? No noted wound(s)       Dayne Prevention initiated by RN: No  Wound Care Orders initiated by RN: No    Pressure Injury (Stage 3,4, Unstageable, DTI, NWPT, and Complex wounds) if present, place Wound referral order by RN under : No    New Ostomies, if present place, Ostomy referral order under : No     Nurse 1 eSignature: Electronically signed by Anne Gil RN on 3/18/25 at 7:43 PM CDT    **SHARE this note so that the co-signing nurse can place an eSignature**    Nurse 2 eSignature: Electronically signed by Iliana Laboy RN on 3/18/25 at 7:54 PM CDT   
CLINICAL PHARMACY NOTE: MEDS TO BEDS    Total # of Prescriptions Filled: 1   The following medications were delivered to the patient:  Current Discharge Medication List        START taking these medications    Details   cefdinir (OMNICEF) 300 MG capsule Take 1 capsule by mouth 2 times daily for 5 days  Qty: 10 capsule, Refills: 0               Additional Documentation:  Delivered to patient bedside. No copay.   
  ORG Escherichia coli*  Proteus mirabilis*       Assessment/Plan:   Urinary tract infection  -UC growing E. Coli, Proteus Mirabilis pending sensitivity  -BC pending  -Continue Rocephin  -STD testing pending    Streptococcus infection, group A  -Treated appropriately last month with amoxicillin  -Echo shows no vegetation or valvular abnormalities    AC  -Creatinine 1.7 > 1.4 (baseline 1.1)  -Continue maintenance IVF  -CT kidney shows no acute abnormality  -Hold nephrotoxic medications    Hypokalemia  -Replace per protocol    Hypertension  -Vitals per routine  -Continue norvasc    GERD (gastroesophageal reflux disease)  -Continue protonix    Hyperlipidemia  -Continue crestor    Antibiotic: Rocephin     DVT Prophylaxis: Up ad raciel     Discharge planning: Tomorrow     Advance Directive: Full Code    Diet: ADULT DIET; Regular; Low Fat/Low Chol/High Fiber/2 gm Na     Consults Made:   None    Further orders per clinical course/attending physician.    EMR Dragon/Transcription disclaimer:   Much of this encounter note is an electronic transcription/translation of spoken language to printed text. The electronic translation of spoken language may permit erroneous words or phrases to be inadvertently transcribed; although attempts have made to review the note for such errors, some may still exist.

## 2025-03-20 NOTE — DISCHARGE INSTR - DIET
Good nutrition is important when healing from an illness, injury, or surgery.  Follow any nutrition recommendations given to you during your hospital stay.   If you were given an oral nutrition supplement while in the hospital, continue to take this supplement at home.  You can take it with meals, in-between meals, and/or before bedtime. These supplements can be purchased at most local grocery stores, pharmacies, and chain [employer]-stores.   If you have any questions about your diet or nutrition, call the hospital and ask for the dietitian.    Regular low fat low cholesterol high fiber low sodium diet

## 2025-03-20 NOTE — DISCHARGE SUMMARY
prominent consistent with age related changes. There is no hydrocephalus.  Paranasal sinuses and mastoid air cells: Visualized portions of paranasal sinuses are clear.  Mastoid air cells are clear.  Orbits: Normal visualized portions.  Sella/Skull Base: Normal.  Bones: Calvarium is normal. 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  Scalp/Soft Tissues: Scalp and visualized soft tissues are normal.      1. No intracranial hemorrhage. 2. No acute findings.  All CT scans are performed using dose optimization techniques as appropriate to the performed exam and include at least one of the following: Automated exposure control, adjustment of the mA and/or kV according to size, and the use of iterative reconstruction technique.  ______________________________________ Electronically signed by: OLIVER DOLL M.D. Date:     03/18/2025 Time:    14:22     XR CHEST PORTABLE  Result Date: 3/18/2025  EXAM: CHEST RADIOGRAPH  TECHNIQUE: Single frontal chest radiograph.  HISTORY: Shortness of breath.  COMPARISON: 02/15/2025.  FINDINGS: The lungs show no consolidation, pleural effusion pneumothorax. The cardiomediastinal silhouette and pulmonary vessels are within normal limits.  The upper abdomen is unremarkable.  No acute bony abnormality.       1.  No acute cardiopulmonary disease.    ______________________________________ Electronically signed by: ROBERTO MORRELL M.D. Date:     03/18/2025 Time:    14:22       Pertinent Labs:  CBC:   Recent Labs     03/18/25  1419 03/19/25  0438 03/20/25  0357   WBC 8.6 7.6 7.7   HGB 11.7* 10.0* 10.3*    143 151     BMP:    Recent Labs     03/18/25  1419 03/19/25  0438 03/20/25  0357    139 140   K 3.8 3.3* 3.8    103 104   CO2 26 25 27   BUN 19 16 12   CREATININE 1.7* 1.4* 1.4*   GLUCOSE 98 140* 106*     INR: No results for input(s): \"INR\" in the last 72 hours.    Physical Exam:  Vital Signs: /84   Pulse 71   Temp 98.4 °F (36.9 °C)

## 2025-03-23 LAB
BACTERIA BLD CULT ORG #2: NORMAL
BACTERIA BLD CULT: NORMAL

## 2025-04-17 PROBLEM — N39.0 URINARY TRACT INFECTION: Status: RESOLVED | Noted: 2025-03-18 | Resolved: 2025-04-17

## 2025-07-13 ENCOUNTER — OFFICE VISIT (OUTPATIENT)
Age: 55
End: 2025-07-13

## 2025-07-13 VITALS
WEIGHT: 315 LBS | HEIGHT: 74 IN | RESPIRATION RATE: 20 BRPM | BODY MASS INDEX: 40.43 KG/M2 | SYSTOLIC BLOOD PRESSURE: 128 MMHG | OXYGEN SATURATION: 99 % | TEMPERATURE: 97.2 F | HEART RATE: 84 BPM | DIASTOLIC BLOOD PRESSURE: 78 MMHG

## 2025-07-13 DIAGNOSIS — M10.9 ACUTE GOUT INVOLVING TOE OF RIGHT FOOT, UNSPECIFIED CAUSE: Primary | ICD-10-CM

## 2025-07-13 DIAGNOSIS — M10.9 ACUTE GOUT OF RIGHT ANKLE, UNSPECIFIED CAUSE: ICD-10-CM

## 2025-07-13 RX ORDER — COLCHICINE 0.6 MG/1
TABLET ORAL
Qty: 3 TABLET | Refills: 0 | Status: SHIPPED | OUTPATIENT
Start: 2025-07-13

## 2025-07-13 RX ORDER — COLCHICINE 0.6 MG/1
TABLET ORAL
Qty: 3 TABLET | Refills: 0 | Status: CANCELLED | OUTPATIENT
Start: 2025-07-13

## 2025-07-13 RX ORDER — DEXAMETHASONE SODIUM PHOSPHATE 10 MG/ML
10 INJECTION, SOLUTION INTRA-ARTICULAR; INTRALESIONAL; INTRAMUSCULAR; INTRAVENOUS; SOFT TISSUE ONCE
Status: COMPLETED | OUTPATIENT
Start: 2025-07-13 | End: 2025-07-13

## 2025-07-13 RX ORDER — PREDNISONE 20 MG/1
20 TABLET ORAL DAILY
Qty: 5 TABLET | Refills: 0 | Status: SHIPPED | OUTPATIENT
Start: 2025-07-13 | End: 2025-07-18

## 2025-07-13 RX ADMIN — DEXAMETHASONE SODIUM PHOSPHATE 10 MG: 10 INJECTION, SOLUTION INTRA-ARTICULAR; INTRALESIONAL; INTRAMUSCULAR; INTRAVENOUS; SOFT TISSUE at 10:59

## 2025-08-22 ENCOUNTER — OFFICE VISIT (OUTPATIENT)
Age: 55
End: 2025-08-22
Payer: MEDICAID

## 2025-08-22 VITALS — BODY MASS INDEX: 40.43 KG/M2 | HEIGHT: 74 IN | WEIGHT: 315 LBS

## 2025-08-22 DIAGNOSIS — G89.29 CHRONIC HIP PAIN AFTER TOTAL REPLACEMENT OF LEFT HIP JOINT: ICD-10-CM

## 2025-08-22 DIAGNOSIS — M25.552 CHRONIC HIP PAIN AFTER TOTAL REPLACEMENT OF LEFT HIP JOINT: ICD-10-CM

## 2025-08-22 DIAGNOSIS — Z96.642 CHRONIC HIP PAIN AFTER TOTAL REPLACEMENT OF LEFT HIP JOINT: ICD-10-CM

## 2025-08-22 DIAGNOSIS — M25.551 RIGHT HIP PAIN: Primary | ICD-10-CM

## 2025-08-22 PROCEDURE — 1036F TOBACCO NON-USER: CPT | Performed by: ORTHOPAEDIC SURGERY

## 2025-08-22 PROCEDURE — 3017F COLORECTAL CA SCREEN DOC REV: CPT | Performed by: ORTHOPAEDIC SURGERY

## 2025-08-22 PROCEDURE — G8427 DOCREV CUR MEDS BY ELIG CLIN: HCPCS | Performed by: ORTHOPAEDIC SURGERY

## 2025-08-22 PROCEDURE — G8417 CALC BMI ABV UP PARAM F/U: HCPCS | Performed by: ORTHOPAEDIC SURGERY

## 2025-08-22 PROCEDURE — 99213 OFFICE O/P EST LOW 20 MIN: CPT | Performed by: ORTHOPAEDIC SURGERY

## 2025-08-22 RX ORDER — TRAMADOL HYDROCHLORIDE 50 MG/1
50 TABLET ORAL EVERY 4 HOURS PRN
Qty: 40 TABLET | Refills: 0 | Status: CANCELLED | OUTPATIENT
Start: 2025-08-22 | End: 2025-09-01

## 2025-09-05 ENCOUNTER — HOSPITAL ENCOUNTER (OUTPATIENT)
Dept: CT IMAGING | Age: 55
Discharge: HOME | End: 2025-09-05
Payer: MEDICAID

## 2025-09-05 DIAGNOSIS — M25.552 CHRONIC HIP PAIN AFTER TOTAL REPLACEMENT OF LEFT HIP JOINT: ICD-10-CM

## 2025-09-05 DIAGNOSIS — Z96.642 CHRONIC HIP PAIN AFTER TOTAL REPLACEMENT OF LEFT HIP JOINT: ICD-10-CM

## 2025-09-05 DIAGNOSIS — G89.29 CHRONIC HIP PAIN AFTER TOTAL REPLACEMENT OF LEFT HIP JOINT: ICD-10-CM

## 2025-09-05 PROCEDURE — 73700 CT LOWER EXTREMITY W/O DYE: CPT
